# Patient Record
Sex: FEMALE | Race: WHITE | NOT HISPANIC OR LATINO | Employment: OTHER | URBAN - METROPOLITAN AREA
[De-identification: names, ages, dates, MRNs, and addresses within clinical notes are randomized per-mention and may not be internally consistent; named-entity substitution may affect disease eponyms.]

---

## 2017-06-19 ENCOUNTER — RECORDS - HEALTHEAST (OUTPATIENT)
Dept: LAB | Facility: CLINIC | Age: 67
End: 2017-06-19

## 2017-06-19 LAB
CHOLEST SERPL-MCNC: 214 MG/DL
FASTING STATUS PATIENT QL REPORTED: ABNORMAL
HDLC SERPL-MCNC: 49 MG/DL
LDLC SERPL CALC-MCNC: 87 MG/DL
LDLC SERPL CALC-MCNC: ABNORMAL MG/DL
TRIGL SERPL-MCNC: 420 MG/DL

## 2018-07-18 ENCOUNTER — RECORDS - HEALTHEAST (OUTPATIENT)
Dept: LAB | Facility: CLINIC | Age: 68
End: 2018-07-18

## 2018-07-18 LAB
ANION GAP SERPL CALCULATED.3IONS-SCNC: 10 MMOL/L (ref 5–18)
BUN SERPL-MCNC: 18 MG/DL (ref 8–22)
CALCIUM SERPL-MCNC: 9.7 MG/DL (ref 8.5–10.5)
CHLORIDE BLD-SCNC: 106 MMOL/L (ref 98–107)
CHOLEST SERPL-MCNC: 185 MG/DL
CO2 SERPL-SCNC: 25 MMOL/L (ref 22–31)
CREAT SERPL-MCNC: 0.8 MG/DL (ref 0.6–1.1)
FASTING STATUS PATIENT QL REPORTED: ABNORMAL
GFR SERPL CREATININE-BSD FRML MDRD: >60 ML/MIN/1.73M2
GLUCOSE BLD-MCNC: 138 MG/DL (ref 70–125)
HDLC SERPL-MCNC: 54 MG/DL
LDLC SERPL CALC-MCNC: 72 MG/DL
POTASSIUM BLD-SCNC: 5 MMOL/L (ref 3.5–5)
SODIUM SERPL-SCNC: 141 MMOL/L (ref 136–145)
TRIGL SERPL-MCNC: 297 MG/DL

## 2019-03-15 ENCOUNTER — RECORDS - HEALTHEAST (OUTPATIENT)
Dept: LAB | Facility: CLINIC | Age: 69
End: 2019-03-15

## 2019-03-15 LAB
ANION GAP SERPL CALCULATED.3IONS-SCNC: 13 MMOL/L (ref 5–18)
BUN SERPL-MCNC: 16 MG/DL (ref 8–22)
CALCIUM SERPL-MCNC: 9.8 MG/DL (ref 8.5–10.5)
CHLORIDE BLD-SCNC: 105 MMOL/L (ref 98–107)
CO2 SERPL-SCNC: 24 MMOL/L (ref 22–31)
CREAT SERPL-MCNC: 0.93 MG/DL (ref 0.6–1.1)
GFR SERPL CREATININE-BSD FRML MDRD: 60 ML/MIN/1.73M2
GLUCOSE BLD-MCNC: 116 MG/DL (ref 70–125)
POTASSIUM BLD-SCNC: 4.9 MMOL/L (ref 3.5–5)
SODIUM SERPL-SCNC: 142 MMOL/L (ref 136–145)

## 2020-09-30 ENCOUNTER — RECORDS - HEALTHEAST (OUTPATIENT)
Dept: LAB | Facility: CLINIC | Age: 70
End: 2020-09-30

## 2020-09-30 LAB
ANION GAP SERPL CALCULATED.3IONS-SCNC: 11 MMOL/L (ref 5–18)
BUN SERPL-MCNC: 16 MG/DL (ref 8–22)
CALCIUM SERPL-MCNC: 9.4 MG/DL (ref 8.5–10.5)
CHLORIDE BLD-SCNC: 103 MMOL/L (ref 98–107)
CHOLEST SERPL-MCNC: 239 MG/DL
CO2 SERPL-SCNC: 25 MMOL/L (ref 22–31)
CREAT SERPL-MCNC: 0.86 MG/DL (ref 0.6–1.1)
FASTING STATUS PATIENT QL REPORTED: ABNORMAL
GFR SERPL CREATININE-BSD FRML MDRD: >60 ML/MIN/1.73M2
GLUCOSE BLD-MCNC: 121 MG/DL (ref 70–125)
HDLC SERPL-MCNC: 107 MG/DL
LDLC SERPL CALC-MCNC: 115 MG/DL
POTASSIUM BLD-SCNC: 4.5 MMOL/L (ref 3.5–5)
SODIUM SERPL-SCNC: 139 MMOL/L (ref 136–145)
TRIGL SERPL-MCNC: 85 MG/DL

## 2021-05-28 ENCOUNTER — RECORDS - HEALTHEAST (OUTPATIENT)
Dept: ADMINISTRATIVE | Facility: CLINIC | Age: 71
End: 2021-05-28

## 2021-05-28 ENCOUNTER — RECORDS - HEALTHEAST (OUTPATIENT)
Dept: LAB | Facility: CLINIC | Age: 71
End: 2021-05-28

## 2021-05-28 LAB
ANION GAP SERPL CALCULATED.3IONS-SCNC: 12 MMOL/L (ref 5–18)
BUN SERPL-MCNC: 18 MG/DL (ref 8–22)
CALCIUM SERPL-MCNC: 9 MG/DL (ref 8.5–10.5)
CHLORIDE BLD-SCNC: 102 MMOL/L (ref 98–107)
CHOLEST SERPL-MCNC: 181 MG/DL
CO2 SERPL-SCNC: 23 MMOL/L (ref 22–31)
CREAT SERPL-MCNC: 1.22 MG/DL (ref 0.6–1.1)
FASTING STATUS PATIENT QL REPORTED: YES
FERRITIN SERPL-MCNC: 22 NG/ML (ref 10–130)
GFR SERPL CREATININE-BSD FRML MDRD: 44 ML/MIN/1.73M2
GLUCOSE BLD-MCNC: 117 MG/DL (ref 70–125)
HDLC SERPL-MCNC: 61 MG/DL
IRON SATN MFR SERPL: 15 % (ref 20–50)
IRON SERPL-MCNC: 58 UG/DL (ref 42–175)
LDLC SERPL CALC-MCNC: 90 MG/DL
POTASSIUM BLD-SCNC: 5.3 MMOL/L (ref 3.5–5)
SODIUM SERPL-SCNC: 137 MMOL/L (ref 136–145)
TIBC SERPL-MCNC: 378 UG/DL (ref 313–563)
TRANSFERRIN SERPL-MCNC: 302 MG/DL (ref 212–360)
TRIGL SERPL-MCNC: 150 MG/DL
VIT B12 SERPL-MCNC: 361 PG/ML (ref 213–816)

## 2021-05-29 LAB
BASOPHILS # BLD AUTO: 0 THOU/UL (ref 0–0.2)
BASOPHILS NFR BLD AUTO: 1 % (ref 0–2)
EOSINOPHIL # BLD AUTO: 0.3 THOU/UL (ref 0–0.4)
EOSINOPHIL NFR BLD AUTO: 5 % (ref 0–6)
ERYTHROCYTE [DISTWIDTH] IN BLOOD BY AUTOMATED COUNT: 12.9 % (ref 11–14.5)
HCT VFR BLD AUTO: 28.7 % (ref 35–47)
HGB BLD-MCNC: 9.1 G/DL (ref 12–16)
IMM GRANULOCYTES # BLD: 0 THOU/UL
IMM GRANULOCYTES NFR BLD: 0 %
LYMPHOCYTES # BLD AUTO: 1.9 THOU/UL (ref 0.8–4.4)
LYMPHOCYTES NFR BLD AUTO: 32 % (ref 20–40)
MCH RBC QN AUTO: 31.3 PG (ref 27–34)
MCHC RBC AUTO-ENTMCNC: 31.7 G/DL (ref 32–36)
MCV RBC AUTO: 99 FL (ref 80–100)
MONOCYTES # BLD AUTO: 0.4 THOU/UL (ref 0–0.9)
MONOCYTES NFR BLD AUTO: 6 % (ref 2–10)
NEUTROPHILS # BLD AUTO: 3.2 THOU/UL (ref 2–7.7)
NEUTROPHILS NFR BLD AUTO: 55 % (ref 50–70)
PATH REPORT.MICROSCOPIC SPEC OTHER STN: ABNORMAL
PLATELET # BLD AUTO: 264 THOU/UL (ref 140–440)
PMV BLD AUTO: 9.9 FL (ref 8.5–12.5)
RBC # BLD AUTO: 2.91 MILL/UL (ref 3.8–5.4)
WBC: 5.8 THOU/UL (ref 4–11)

## 2021-06-01 LAB
LAB AP CHARGES (HE HISTORICAL CONVERSION): NORMAL
PATH REPORT.COMMENTS IMP SPEC: NORMAL
PATH REPORT.COMMENTS IMP SPEC: NORMAL
PATH REPORT.FINAL DX SPEC: NORMAL
PATH REPORT.MICROSCOPIC SPEC OTHER STN: NORMAL
PATH REPORT.RELEVANT HX SPEC: NORMAL

## 2021-06-08 ENCOUNTER — RECORDS - HEALTHEAST (OUTPATIENT)
Dept: LAB | Facility: CLINIC | Age: 71
End: 2021-06-08

## 2021-06-08 LAB
ANION GAP SERPL CALCULATED.3IONS-SCNC: 14 MMOL/L (ref 5–18)
BUN SERPL-MCNC: 12 MG/DL (ref 8–22)
CALCIUM SERPL-MCNC: 8.8 MG/DL (ref 8.5–10.5)
CHLORIDE BLD-SCNC: 99 MMOL/L (ref 98–107)
CO2 SERPL-SCNC: 22 MMOL/L (ref 22–31)
CREAT SERPL-MCNC: 1.52 MG/DL (ref 0.6–1.1)
FOLATE SERPL-MCNC: 6.8 NG/ML
GFR SERPL CREATININE-BSD FRML MDRD: 34 ML/MIN/1.73M2
GLUCOSE BLD-MCNC: 118 MG/DL (ref 70–125)
POTASSIUM BLD-SCNC: 4.7 MMOL/L (ref 3.5–5)
SODIUM SERPL-SCNC: 135 MMOL/L (ref 136–145)

## 2021-06-10 LAB
ALBUMIN PERCENT: 56.2 % (ref 51–67)
ALBUMIN SERPL ELPH-MCNC: 4.2 G/DL (ref 3.2–4.7)
ALPHA 1 PERCENT: 3 % (ref 2–4)
ALPHA 2 PERCENT: 10.9 % (ref 5–13)
ALPHA1 GLOB SERPL ELPH-MCNC: 0.2 G/DL (ref 0.1–0.3)
ALPHA2 GLOB SERPL ELPH-MCNC: 0.8 G/DL (ref 0.4–0.9)
B-GLOBULIN SERPL ELPH-MCNC: 1.1 G/DL (ref 0.7–1.2)
BETA PERCENT: 14.5 % (ref 10–17)
GAMMA GLOB SERPL ELPH-MCNC: 1.1 G/DL (ref 0.6–1.4)
GAMMA GLOBULIN PERCENT: 15.4 % (ref 9–20)
PATH ICD:: NORMAL
PROT PATTERN SERPL ELPH-IMP: NORMAL
PROT SERPL-MCNC: 7.4 G/DL (ref 6–8)
REVIEWING PATHOLOGIST: NORMAL

## 2021-07-30 ENCOUNTER — LAB REQUISITION (OUTPATIENT)
Dept: LAB | Facility: CLINIC | Age: 71
End: 2021-07-30

## 2021-07-30 DIAGNOSIS — N19 UNSPECIFIED KIDNEY FAILURE: ICD-10-CM

## 2021-07-30 DIAGNOSIS — Z87.39 PERSONAL HISTORY OF OTHER DISEASES OF THE MUSCULOSKELETAL SYSTEM AND CONNECTIVE TISSUE: ICD-10-CM

## 2021-07-30 LAB
ANION GAP SERPL CALCULATED.3IONS-SCNC: 11 MMOL/L (ref 5–18)
BUN SERPL-MCNC: 17 MG/DL (ref 8–22)
CALCIUM SERPL-MCNC: 9.9 MG/DL (ref 8.5–10.5)
CHLORIDE BLD-SCNC: 101 MMOL/L (ref 98–107)
CO2 SERPL-SCNC: 22 MMOL/L (ref 22–31)
CREAT SERPL-MCNC: 1.04 MG/DL (ref 0.6–1.1)
GFR SERPL CREATININE-BSD FRML MDRD: 55 ML/MIN/1.73M2
GLUCOSE BLD-MCNC: 107 MG/DL (ref 70–125)
POTASSIUM BLD-SCNC: 5 MMOL/L (ref 3.5–5)
SODIUM SERPL-SCNC: 134 MMOL/L (ref 136–145)
URATE SERPL-MCNC: 6 MG/DL (ref 2–7.5)

## 2021-07-30 PROCEDURE — 80048 BASIC METABOLIC PNL TOTAL CA: CPT | Performed by: FAMILY MEDICINE

## 2021-07-30 PROCEDURE — 36415 COLL VENOUS BLD VENIPUNCTURE: CPT | Performed by: FAMILY MEDICINE

## 2021-07-30 PROCEDURE — 84550 ASSAY OF BLOOD/URIC ACID: CPT | Performed by: FAMILY MEDICINE

## 2022-01-01 ENCOUNTER — APPOINTMENT (OUTPATIENT)
Dept: ULTRASOUND IMAGING | Facility: HOSPITAL | Age: 72
DRG: 094 | End: 2022-01-01
Attending: FAMILY MEDICINE
Payer: COMMERCIAL

## 2022-01-01 ENCOUNTER — APPOINTMENT (OUTPATIENT)
Dept: MRI IMAGING | Facility: HOSPITAL | Age: 72
DRG: 094 | End: 2022-01-01
Attending: PSYCHIATRY & NEUROLOGY
Payer: COMMERCIAL

## 2022-01-01 ENCOUNTER — APPOINTMENT (OUTPATIENT)
Dept: NUCLEAR MEDICINE | Facility: HOSPITAL | Age: 72
DRG: 094 | End: 2022-01-01
Attending: FAMILY MEDICINE
Payer: COMMERCIAL

## 2022-01-01 ENCOUNTER — APPOINTMENT (OUTPATIENT)
Dept: INTERVENTIONAL RADIOLOGY/VASCULAR | Facility: HOSPITAL | Age: 72
DRG: 094 | End: 2022-01-01
Attending: NURSE PRACTITIONER
Payer: COMMERCIAL

## 2022-01-01 ENCOUNTER — APPOINTMENT (OUTPATIENT)
Dept: PHYSICAL THERAPY | Facility: HOSPITAL | Age: 72
DRG: 094 | End: 2022-01-01
Attending: INTERNAL MEDICINE
Payer: COMMERCIAL

## 2022-01-01 ENCOUNTER — APPOINTMENT (OUTPATIENT)
Dept: RADIOLOGY | Facility: HOSPITAL | Age: 72
DRG: 094 | End: 2022-01-01
Attending: INTERNAL MEDICINE
Payer: COMMERCIAL

## 2022-01-01 ENCOUNTER — HOSPITAL ENCOUNTER (OUTPATIENT)
Facility: CLINIC | Age: 72
End: 2022-01-01
Payer: COMMERCIAL

## 2022-01-01 ENCOUNTER — ANCILLARY PROCEDURE (OUTPATIENT)
Dept: ULTRASOUND IMAGING | Facility: HOSPITAL | Age: 72
DRG: 094 | End: 2022-01-01
Attending: EMERGENCY MEDICINE
Payer: COMMERCIAL

## 2022-01-01 ENCOUNTER — APPOINTMENT (OUTPATIENT)
Dept: RADIOLOGY | Facility: HOSPITAL | Age: 72
DRG: 094 | End: 2022-01-01
Attending: FAMILY MEDICINE
Payer: COMMERCIAL

## 2022-01-01 ENCOUNTER — HOSPITAL ENCOUNTER (INPATIENT)
Facility: HOSPITAL | Age: 72
LOS: 3 days | DRG: 094 | End: 2022-09-08
Attending: EMERGENCY MEDICINE | Admitting: INTERNAL MEDICINE
Payer: COMMERCIAL

## 2022-01-01 ENCOUNTER — APPOINTMENT (OUTPATIENT)
Dept: CARDIOLOGY | Facility: HOSPITAL | Age: 72
DRG: 094 | End: 2022-01-01
Attending: FAMILY MEDICINE
Payer: COMMERCIAL

## 2022-01-01 ENCOUNTER — APPOINTMENT (OUTPATIENT)
Dept: INTERVENTIONAL RADIOLOGY/VASCULAR | Facility: HOSPITAL | Age: 72
DRG: 094 | End: 2022-01-01
Attending: RADIOLOGY
Payer: COMMERCIAL

## 2022-01-01 ENCOUNTER — APPOINTMENT (OUTPATIENT)
Dept: MRI IMAGING | Facility: HOSPITAL | Age: 72
DRG: 094 | End: 2022-01-01
Attending: EMERGENCY MEDICINE
Payer: COMMERCIAL

## 2022-01-01 ENCOUNTER — APPOINTMENT (OUTPATIENT)
Dept: OCCUPATIONAL THERAPY | Facility: HOSPITAL | Age: 72
DRG: 094 | End: 2022-01-01
Attending: INTERNAL MEDICINE
Payer: COMMERCIAL

## 2022-01-01 ENCOUNTER — APPOINTMENT (OUTPATIENT)
Dept: CT IMAGING | Facility: HOSPITAL | Age: 72
DRG: 094 | End: 2022-01-01
Attending: FAMILY MEDICINE
Payer: COMMERCIAL

## 2022-01-01 VITALS
HEIGHT: 65 IN | TEMPERATURE: 100.6 F | OXYGEN SATURATION: 91 % | BODY MASS INDEX: 28.32 KG/M2 | WEIGHT: 170 LBS | SYSTOLIC BLOOD PRESSURE: 115 MMHG | DIASTOLIC BLOOD PRESSURE: 56 MMHG

## 2022-01-01 DIAGNOSIS — M60.9 MYOSITIS OF MULTIPLE SITES, UNSPECIFIED MYOSITIS TYPE: ICD-10-CM

## 2022-01-01 DIAGNOSIS — I46.9 CARDIAC ARREST (H): ICD-10-CM

## 2022-01-01 DIAGNOSIS — N17.9 ACUTE KIDNEY INJURY (H): ICD-10-CM

## 2022-01-01 LAB
ALBUMIN MFR UR ELPH: 26.8 MG/DL
ALBUMIN SERPL-MCNC: 1.9 G/DL (ref 3.5–5)
ALBUMIN SERPL-MCNC: 2.5 G/DL (ref 3.5–5)
ALBUMIN SERPL-MCNC: 3.3 G/DL (ref 3.5–5)
ALBUMIN UR-MCNC: 30 MG/DL
ALDOLASE SERPL-CCNC: 14 U/L
ALP SERPL-CCNC: 205 U/L (ref 45–120)
ALP SERPL-CCNC: 84 U/L (ref 45–120)
ALP SERPL-CCNC: 99 U/L (ref 45–120)
ALT SERPL W P-5'-P-CCNC: 17 U/L (ref 0–45)
ALT SERPL W P-5'-P-CCNC: 20 U/L (ref 0–45)
ALT SERPL W P-5'-P-CCNC: 47 U/L (ref 0–45)
AMMONIA PLAS-SCNC: 43 UMOL/L (ref 11–35)
AMMONIA PLAS-SCNC: 55 UMOL/L (ref 11–35)
AMORPH CRY #/AREA URNS HPF: ABNORMAL /HPF
ANA PAT SER IF-IMP: ABNORMAL
ANA SER QL IF: POSITIVE
ANA TITR SER IF: ABNORMAL {TITER}
ANCA AB PATTERN SER IF-IMP: NORMAL
ANION GAP SERPL CALCULATED.3IONS-SCNC: 13 MMOL/L (ref 5–18)
ANION GAP SERPL CALCULATED.3IONS-SCNC: 14 MMOL/L (ref 5–18)
ANION GAP SERPL CALCULATED.3IONS-SCNC: 15 MMOL/L (ref 5–18)
ANION GAP SERPL CALCULATED.3IONS-SCNC: 16 MMOL/L (ref 5–18)
ANION GAP SERPL CALCULATED.3IONS-SCNC: 17 MMOL/L (ref 5–18)
ANION GAP SERPL CALCULATED.3IONS-SCNC: 25 MMOL/L (ref 5–18)
APPEARANCE CSF: CLEAR
APPEARANCE UR: ABNORMAL
AST SERPL W P-5'-P-CCNC: 105 U/L (ref 0–40)
AST SERPL W P-5'-P-CCNC: 55 U/L (ref 0–40)
AST SERPL W P-5'-P-CCNC: 57 U/L (ref 0–40)
ATRIAL RATE - MUSE: 103 BPM
ATRIAL RATE - MUSE: 139 BPM
BASE EXCESS BLDA CALC-SCNC: -10.8 MMOL/L
BASE EXCESS BLDA CALC-SCNC: -3.4 MMOL/L
BASE EXCESS BLDA CALC-SCNC: -9.7 MMOL/L
BASE EXCESS BLDA CALC-SCNC: 8.6 MMOL/L
BASOPHILS # BLD MANUAL: 0 10E3/UL (ref 0–0.2)
BASOPHILS # BLD MANUAL: 0 10E3/UL (ref 0–0.2)
BASOPHILS NFR BLD MANUAL: 0 %
BASOPHILS NFR BLD MANUAL: 0 %
BILIRUB DIRECT SERPL-MCNC: 1.2 MG/DL
BILIRUB DIRECT SERPL-MCNC: 1.7 MG/DL
BILIRUB SERPL-MCNC: 1.6 MG/DL (ref 0–1)
BILIRUB SERPL-MCNC: 1.9 MG/DL (ref 0–1)
BILIRUB SERPL-MCNC: 2.3 MG/DL (ref 0–1)
BILIRUB UR QL STRIP: NEGATIVE
BNP SERPL-MCNC: 895 PG/ML (ref 0–123)
BUN SERPL-MCNC: 48 MG/DL (ref 8–28)
BUN SERPL-MCNC: 60 MG/DL (ref 8–28)
BUN SERPL-MCNC: 62 MG/DL (ref 8–28)
BUN SERPL-MCNC: 67 MG/DL (ref 8–28)
BUN SERPL-MCNC: 68 MG/DL (ref 8–28)
BUN SERPL-MCNC: 75 MG/DL (ref 8–28)
C REACTIVE PROTEIN LHE: 33.6 MG/DL (ref 0–?)
C REACTIVE PROTEIN LHE: 52.1 MG/DL (ref 0–?)
C-ANCA TITR SER IF: NORMAL {TITER}
C3 SERPL-MCNC: 190 MG/DL (ref 81–157)
C4 SERPL-MCNC: 51 MG/DL (ref 13–39)
CALCIUM SERPL-MCNC: 7.2 MG/DL (ref 8.5–10.5)
CALCIUM SERPL-MCNC: 7.8 MG/DL (ref 8.5–10.5)
CALCIUM SERPL-MCNC: 7.9 MG/DL (ref 8.5–10.5)
CALCIUM SERPL-MCNC: 8 MG/DL (ref 8.5–10.5)
CALCIUM SERPL-MCNC: 8.6 MG/DL (ref 8.5–10.5)
CALCIUM SERPL-MCNC: 8.7 MG/DL (ref 8.5–10.5)
CALCIUM, IONIZED MEASURED: 1.01 MMOL/L (ref 1.11–1.3)
CALCIUM, IONIZED MEASURED: 1.11 MMOL/L (ref 1.11–1.3)
CALCIUM, IONIZED MEASURED: 1.14 MMOL/L (ref 1.11–1.3)
CHLORIDE BLD-SCNC: 101 MMOL/L (ref 98–107)
CHLORIDE BLD-SCNC: 101 MMOL/L (ref 98–107)
CHLORIDE BLD-SCNC: 104 MMOL/L (ref 98–107)
CHLORIDE BLD-SCNC: 104 MMOL/L (ref 98–107)
CHLORIDE BLD-SCNC: 105 MMOL/L (ref 98–107)
CHLORIDE BLD-SCNC: 95 MMOL/L (ref 98–107)
CK SERPL-CCNC: 1280 U/L (ref 30–190)
CK SERPL-CCNC: 241 U/L (ref 30–190)
CK SERPL-CCNC: 811 U/L (ref 30–190)
CK SERPL-CCNC: 868 U/L (ref 30–190)
CO2 SERPL-SCNC: 15 MMOL/L (ref 22–31)
CO2 SERPL-SCNC: 17 MMOL/L (ref 22–31)
CO2 SERPL-SCNC: 18 MMOL/L (ref 22–31)
CO2 SERPL-SCNC: 18 MMOL/L (ref 22–31)
COHGB MFR BLD: 90.3 % (ref 95–96)
COHGB MFR BLD: 95.9 % (ref 95–96)
COHGB MFR BLD: 99.3 % (ref 95–96)
COHGB MFR BLD: >100 % (ref 95–96)
COLOR CSF: COLORLESS
COLOR UR AUTO: YELLOW
CORTIS SERPL-MCNC: 25.9 UG/DL
CREAT SERPL-MCNC: 1.78 MG/DL (ref 0.6–1.1)
CREAT SERPL-MCNC: 2.72 MG/DL (ref 0.6–1.1)
CREAT SERPL-MCNC: 2.75 MG/DL (ref 0.6–1.1)
CREAT SERPL-MCNC: 2.79 MG/DL (ref 0.6–1.1)
CREAT SERPL-MCNC: 2.88 MG/DL (ref 0.6–1.1)
CREAT SERPL-MCNC: 3.67 MG/DL (ref 0.6–1.1)
CREAT UR-MCNC: 95 MG/DL
D DIMER PPP FEU-MCNC: 6.23 UG/ML FEU (ref 0–0.5)
DIASTOLIC BLOOD PRESSURE - MUSE: NORMAL MMHG
DIASTOLIC BLOOD PRESSURE - MUSE: NORMAL MMHG
ENA JO1 AB SER IA-ACNC: <0.5 U/ML
ENA JO1 IGG SER-ACNC: NEGATIVE
ENA SS-A AB SER IA-ACNC: 0.5 U/ML
ENA SS-A AB SER IA-ACNC: NEGATIVE
ENA SS-B IGG SER IA-ACNC: <0.6 U/ML
ENA SS-B IGG SER IA-ACNC: NEGATIVE
EOSINOPHIL # BLD MANUAL: 0 10E3/UL (ref 0–0.7)
EOSINOPHIL # BLD MANUAL: 0.1 10E3/UL (ref 0–0.7)
EOSINOPHIL NFR BLD MANUAL: 0 %
EOSINOPHIL NFR BLD MANUAL: 1 %
ERYTHROCYTE [DISTWIDTH] IN BLOOD BY AUTOMATED COUNT: 13.1 % (ref 10–15)
ERYTHROCYTE [DISTWIDTH] IN BLOOD BY AUTOMATED COUNT: 13.6 % (ref 10–15)
ERYTHROCYTE [DISTWIDTH] IN BLOOD BY AUTOMATED COUNT: 13.7 % (ref 10–15)
ERYTHROCYTE [DISTWIDTH] IN BLOOD BY AUTOMATED COUNT: 13.8 % (ref 10–15)
ERYTHROCYTE [DISTWIDTH] IN BLOOD BY AUTOMATED COUNT: 14.1 % (ref 10–15)
ERYTHROCYTE [DISTWIDTH] IN BLOOD BY AUTOMATED COUNT: 14.3 % (ref 10–15)
ERYTHROCYTE [SEDIMENTATION RATE] IN BLOOD BY WESTERGREN METHOD: 110 MM/HR (ref 0–20)
FLOW: 3 LPM
FLUAV RNA SPEC QL NAA+PROBE: NEGATIVE
FLUBV RNA RESP QL NAA+PROBE: NEGATIVE
FOLATE SERPL-MCNC: >40 NG/ML (ref 4.6–34.8)
GFR SERPL CREATININE-BSD FRML MDRD: 13 ML/MIN/1.73M2
GFR SERPL CREATININE-BSD FRML MDRD: 17 ML/MIN/1.73M2
GFR SERPL CREATININE-BSD FRML MDRD: 17 ML/MIN/1.73M2
GFR SERPL CREATININE-BSD FRML MDRD: 18 ML/MIN/1.73M2
GFR SERPL CREATININE-BSD FRML MDRD: 18 ML/MIN/1.73M2
GFR SERPL CREATININE-BSD FRML MDRD: 30 ML/MIN/1.73M2
GLUCOSE BLD-MCNC: 56 MG/DL (ref 70–125)
GLUCOSE BLD-MCNC: 62 MG/DL (ref 70–125)
GLUCOSE BLD-MCNC: 69 MG/DL (ref 70–125)
GLUCOSE BLD-MCNC: 81 MG/DL (ref 70–125)
GLUCOSE BLD-MCNC: 88 MG/DL (ref 70–125)
GLUCOSE BLD-MCNC: 98 MG/DL (ref 70–125)
GLUCOSE BLDC GLUCOMTR-MCNC: 106 MG/DL (ref 70–99)
GLUCOSE BLDC GLUCOMTR-MCNC: 59 MG/DL (ref 70–99)
GLUCOSE CSF-MCNC: 55 MG/DL (ref 40–75)
GLUCOSE UR STRIP-MCNC: NEGATIVE MG/DL
GRAM STAIN RESULT: NORMAL
HAPTOGLOB SERPL-MCNC: 293 MG/DL (ref 32–197)
HCO3 BLD-SCNC: 17 MMOL/L (ref 23–29)
HCO3 BLD-SCNC: 22 MMOL/L (ref 23–29)
HCO3 BLD-SCNC: 31 MMOL/L (ref 23–29)
HCO3 BLD-SCNC: ABNORMAL MMOL/L
HCT VFR BLD AUTO: 26.7 % (ref 35–47)
HCT VFR BLD AUTO: 29.4 % (ref 35–47)
HCT VFR BLD AUTO: 29.9 % (ref 35–47)
HCT VFR BLD AUTO: 30 % (ref 35–47)
HCT VFR BLD AUTO: 32.3 % (ref 35–47)
HCT VFR BLD AUTO: 33.9 % (ref 35–47)
HGB BLD-MCNC: 10.8 G/DL (ref 11.7–15.7)
HGB BLD-MCNC: 10.8 G/DL (ref 11.7–15.7)
HGB BLD-MCNC: 8.3 G/DL (ref 11.7–15.7)
HGB BLD-MCNC: 9.6 G/DL (ref 11.7–15.7)
HGB BLD-MCNC: 9.7 G/DL (ref 11.7–15.7)
HGB BLD-MCNC: 9.7 G/DL (ref 11.7–15.7)
HGB UR QL STRIP: NEGATIVE
HOLD SPECIMEN: NORMAL
HYALINE CASTS: 3 /LPF
INTERPRETATION ECG - MUSE: NORMAL
INTERPRETATION ECG - MUSE: NORMAL
ION CA PH 7.4: 0.93 MMOL/L (ref 1.11–1.3)
ION CA PH 7.4: 1.02 MMOL/L (ref 1.11–1.3)
ION CA PH 7.4: ABNORMAL
KETONES UR STRIP-MCNC: NEGATIVE MG/DL
LACTATE SERPL-SCNC: 2.1 MMOL/L (ref 0.7–2)
LDH SERPL L TO P-CCNC: 239 U/L (ref 125–220)
LDH SERPL L TO P-CCNC: 434 U/L (ref 125–220)
LEUKOCYTE ESTERASE UR QL STRIP: ABNORMAL
LVEF ECHO: NORMAL
LYMPHOCYTES # BLD MANUAL: 0.4 10E3/UL (ref 0.8–5.3)
LYMPHOCYTES # BLD MANUAL: 0.8 10E3/UL (ref 0.8–5.3)
LYMPHOCYTES NFR BLD MANUAL: 14 %
LYMPHOCYTES NFR BLD MANUAL: 7 %
MAGNESIUM SERPL-MCNC: 1.9 MG/DL (ref 1.8–2.6)
MAGNESIUM SERPL-MCNC: 5 MG/DL (ref 1.8–2.6)
MCH RBC QN AUTO: 33 PG (ref 26.5–33)
MCH RBC QN AUTO: 33.1 PG (ref 26.5–33)
MCH RBC QN AUTO: 33.4 PG (ref 26.5–33)
MCH RBC QN AUTO: 33.7 PG (ref 26.5–33)
MCH RBC QN AUTO: 33.7 PG (ref 26.5–33)
MCH RBC QN AUTO: 34.2 PG (ref 26.5–33)
MCHC RBC AUTO-ENTMCNC: 31.1 G/DL (ref 31.5–36.5)
MCHC RBC AUTO-ENTMCNC: 31.9 G/DL (ref 31.5–36.5)
MCHC RBC AUTO-ENTMCNC: 32 G/DL (ref 31.5–36.5)
MCHC RBC AUTO-ENTMCNC: 32.4 G/DL (ref 31.5–36.5)
MCHC RBC AUTO-ENTMCNC: 33 G/DL (ref 31.5–36.5)
MCHC RBC AUTO-ENTMCNC: 33.4 G/DL (ref 31.5–36.5)
MCV RBC AUTO: 102 FL (ref 78–100)
MCV RBC AUTO: 103 FL (ref 78–100)
MCV RBC AUTO: 105 FL (ref 78–100)
MCV RBC AUTO: 109 FL (ref 78–100)
METAMYELOCYTES # BLD MANUAL: 0.2 10E3/UL
METAMYELOCYTES NFR BLD MANUAL: 4 %
MONOCYTES # BLD MANUAL: 0.2 10E3/UL (ref 0–1.3)
MONOCYTES # BLD MANUAL: 0.3 10E3/UL (ref 0–1.3)
MONOCYTES NFR BLD AUTO: NEGATIVE %
MONOCYTES NFR BLD MANUAL: 4 %
MONOCYTES NFR BLD MANUAL: 5 %
MUCOUS THREADS #/AREA URNS LPF: PRESENT /LPF
MYELOCYTES # BLD MANUAL: 0.1 10E3/UL
MYELOCYTES NFR BLD MANUAL: 1 %
NEUTROPHILS # BLD MANUAL: 4.2 10E3/UL (ref 1.6–8.3)
NEUTROPHILS # BLD MANUAL: 4.5 10E3/UL (ref 1.6–8.3)
NEUTROPHILS NFR BLD MANUAL: 77 %
NEUTROPHILS NFR BLD MANUAL: 87 %
NITRATE UR QL: NEGATIVE
NRBC # BLD AUTO: 0.1 10E3/UL
NRBC # BLD AUTO: 0.1 10E3/UL
NRBC BLD MANUAL-RTO: 2 %
NRBC BLD MANUAL-RTO: 2 %
O2/TOTAL GAS SETTING VFR VENT: 0.4 %
O2/TOTAL GAS SETTING VFR VENT: 1 %
O2/TOTAL GAS SETTING VFR VENT: 1 %
OSMOLALITY UR: 292 MMOL/KG (ref 100–1200)
OXYHGB MFR BLD: 88.2 % (ref 95–96)
OXYHGB MFR BLD: 94.5 % (ref 95–96)
OXYHGB MFR BLD: 98.1 % (ref 95–96)
OXYHGB MFR BLD: >98.5 % (ref 95–96)
P AXIS - MUSE: 24 DEGREES
P AXIS - MUSE: 32 DEGREES
PATH REPORT.COMMENTS IMP SPEC: NORMAL
PATH REPORT.FINAL DX SPEC: NORMAL
PATH REPORT.FINAL DX SPEC: NORMAL
PATH REPORT.MICROSCOPIC SPEC OTHER STN: NORMAL
PATH REPORT.RELEVANT HX SPEC: NORMAL
PATH REPORT.RELEVANT HX SPEC: NORMAL
PCO2 BLD: 36 MM HG (ref 35–45)
PCO2 BLD: 39 MM HG (ref 35–45)
PCO2 BLD: 43 MM HG (ref 35–45)
PCO2 BLD: 64 MM HG (ref 35–45)
PEEP: 5 CM H2O
PH BLD: 7.23 [PH] (ref 7.37–7.44)
PH BLD: 7.24 [PH] (ref 7.37–7.44)
PH BLD: 7.34 [PH] (ref 7.37–7.44)
PH BLD: 7.38 [PH] (ref 7.37–7.44)
PH UR STRIP: 5 [PH] (ref 5–7)
PH: 7.09 (ref 7.35–7.45)
PH: 7.24 (ref 7.35–7.45)
PH: 7.25 (ref 7.35–7.45)
PHOSPHATE SERPL-MCNC: 4.8 MG/DL (ref 2.5–4.5)
PLAT MORPH BLD: ABNORMAL
PLAT MORPH BLD: ABNORMAL
PLATELET # BLD AUTO: 46 10E3/UL (ref 150–450)
PLATELET # BLD AUTO: 53 10E3/UL (ref 150–450)
PLATELET # BLD AUTO: 57 10E3/UL (ref 150–450)
PLATELET # BLD AUTO: 59 10E3/UL (ref 150–450)
PLATELET # BLD AUTO: 71 10E3/UL (ref 150–450)
PLATELET # BLD AUTO: 86 10E3/UL (ref 150–450)
PO2 BLD: 146 MM HG (ref 75–85)
PO2 BLD: 303 MM HG (ref 75–85)
PO2 BLD: 54 MM HG (ref 75–85)
PO2 BLD: 81 MM HG (ref 75–85)
POTASSIUM BLD-SCNC: 3.7 MMOL/L (ref 3.5–5)
POTASSIUM BLD-SCNC: 3.8 MMOL/L (ref 3.5–5)
POTASSIUM BLD-SCNC: 4.1 MMOL/L (ref 3.5–5)
POTASSIUM BLD-SCNC: 4.1 MMOL/L (ref 3.5–5)
POTASSIUM BLD-SCNC: 4.6 MMOL/L (ref 3.5–5)
POTASSIUM BLD-SCNC: 5.2 MMOL/L (ref 3.5–5)
PR INTERVAL - MUSE: 152 MS
PR INTERVAL - MUSE: 154 MS
PROT CSF-MCNC: 37 MG/DL (ref 15–45)
PROT SERPL-MCNC: 3.9 G/DL (ref 6–8)
PROT SERPL-MCNC: 5.1 G/DL (ref 6–8)
PROT SERPL-MCNC: 6.1 G/DL (ref 6–8)
PROT/CREAT 24H UR: 0.28 MG/MG CR
QRS DURATION - MUSE: 102 MS
QRS DURATION - MUSE: 122 MS
QT - MUSE: 270 MS
QT - MUSE: 346 MS
QTC - MUSE: 410 MS
QTC - MUSE: 453 MS
R AXIS - MUSE: 24 DEGREES
R AXIS - MUSE: 6 DEGREES
RATE: 18 RR/MIN
RATE: 24 RR/MIN
RATE: 26 RR/MIN
RBC # BLD AUTO: 2.46 10E6/UL (ref 3.8–5.2)
RBC # BLD AUTO: 2.88 10E6/UL (ref 3.8–5.2)
RBC # BLD AUTO: 2.91 10E6/UL (ref 3.8–5.2)
RBC # BLD AUTO: 2.93 10E6/UL (ref 3.8–5.2)
RBC # BLD AUTO: 3.16 10E6/UL (ref 3.8–5.2)
RBC # BLD AUTO: 3.23 10E6/UL (ref 3.8–5.2)
RBC # CSF MANUAL: 0 /UL (ref 0–2)
RBC MORPH BLD: ABNORMAL
RBC MORPH BLD: ABNORMAL
RBC URINE: 1 /HPF
RETICS # AUTO: 0.03 10E6/UL (ref 0.01–0.11)
RETICS # AUTO: 0.07 10E6/UL (ref 0.01–0.11)
RETICS/RBC NFR AUTO: 1.2 % (ref 0.8–2.7)
RETICS/RBC NFR AUTO: 2.1 % (ref 0.8–2.7)
RSV RNA SPEC NAA+PROBE: NEGATIVE
SARS-COV-2 RNA RESP QL NAA+PROBE: NEGATIVE
SMA IGG SER-ACNC: 6 UNITS
SODIUM SERPL-SCNC: 129 MMOL/L (ref 136–145)
SODIUM SERPL-SCNC: 131 MMOL/L (ref 136–145)
SODIUM SERPL-SCNC: 132 MMOL/L (ref 136–145)
SODIUM SERPL-SCNC: 133 MMOL/L (ref 136–145)
SODIUM SERPL-SCNC: 136 MMOL/L (ref 136–145)
SODIUM SERPL-SCNC: 147 MMOL/L (ref 136–145)
SODIUM UR-SCNC: <20 MMOL/L
SP GR UR STRIP: 1.03 (ref 1–1.03)
SQUAMOUS EPITHELIAL: <1 /HPF
SYSTOLIC BLOOD PRESSURE - MUSE: NORMAL MMHG
SYSTOLIC BLOOD PRESSURE - MUSE: NORMAL MMHG
T AXIS - MUSE: 33 DEGREES
T AXIS - MUSE: 54 DEGREES
TEMPERATURE: 37 DEGREES C
TOXIC GRANULES BLD QL SMEAR: PRESENT
TOXIC GRANULES BLD QL SMEAR: PRESENT
TRANSITIONAL EPI: <1 /HPF
TRIGL SERPL-MCNC: 441 MG/DL
TROPONIN I SERPL-MCNC: 0.04 NG/ML (ref 0–0.29)
TSH SERPL DL<=0.005 MIU/L-ACNC: 1.31 UIU/ML (ref 0.3–5)
TUBE # CSF: 4
URATE SERPL-MCNC: 10 MG/DL (ref 2–7.5)
UROBILINOGEN UR STRIP-MCNC: <2 MG/DL
VENTILATION MODE: ABNORMAL
VENTILATOR TIDAL VOLUME: 4180 ML
VENTILATOR TIDAL VOLUME: 480 ML
VENTILATOR TIDAL VOLUME: 480 ML
VENTRICULAR RATE- MUSE: 103 BPM
VENTRICULAR RATE- MUSE: 139 BPM
VIT B12 SERPL-MCNC: >4000 PG/ML (ref 232–1245)
WBC # BLD AUTO: 4.8 10E3/UL (ref 4–11)
WBC # BLD AUTO: 4.8 10E3/UL (ref 4–11)
WBC # BLD AUTO: 5.2 10E3/UL (ref 4–11)
WBC # BLD AUTO: 5.4 10E3/UL (ref 4–11)
WBC # BLD AUTO: 6.9 10E3/UL (ref 4–11)
WBC # BLD AUTO: 7.2 10E3/UL (ref 4–11)
WBC # CSF MANUAL: 0 /UL (ref 0–5)
WBC URINE: 6 /HPF

## 2022-01-01 PROCEDURE — 250N000013 HC RX MED GY IP 250 OP 250 PS 637: Performed by: FAMILY MEDICINE

## 2022-01-01 PROCEDURE — 76770 US EXAM ABDO BACK WALL COMP: CPT

## 2022-01-01 PROCEDURE — 250N000013 HC RX MED GY IP 250 OP 250 PS 637: Performed by: INTERNAL MEDICINE

## 2022-01-01 PROCEDURE — 85060 BLOOD SMEAR INTERPRETATION: CPT | Performed by: PATHOLOGY

## 2022-01-01 PROCEDURE — 99233 SBSQ HOSP IP/OBS HIGH 50: CPT | Performed by: INTERNAL MEDICINE

## 2022-01-01 PROCEDURE — 78582 LUNG VENTILAT&PERFUS IMAGING: CPT

## 2022-01-01 PROCEDURE — 86235 NUCLEAR ANTIGEN ANTIBODY: CPT | Performed by: FAMILY MEDICINE

## 2022-01-01 PROCEDURE — 82607 VITAMIN B-12: CPT | Performed by: FAMILY MEDICINE

## 2022-01-01 PROCEDURE — 99292 CRITICAL CARE ADDL 30 MIN: CPT | Mod: 25 | Performed by: INTERNAL MEDICINE

## 2022-01-01 PROCEDURE — 94003 VENT MGMT INPAT SUBQ DAY: CPT

## 2022-01-01 PROCEDURE — 93010 ELECTROCARDIOGRAM REPORT: CPT | Performed by: INTERNAL MEDICINE

## 2022-01-01 PROCEDURE — 84156 ASSAY OF PROTEIN URINE: CPT | Performed by: INTERNAL MEDICINE

## 2022-01-01 PROCEDURE — 250N000009 HC RX 250: Performed by: RADIOLOGY

## 2022-01-01 PROCEDURE — 84155 ASSAY OF PROTEIN SERUM: CPT | Performed by: INTERNAL MEDICINE

## 2022-01-01 PROCEDURE — 02HV33Z INSERTION OF INFUSION DEVICE INTO SUPERIOR VENA CAVA, PERCUTANEOUS APPROACH: ICD-10-PCS | Performed by: FAMILY MEDICINE

## 2022-01-01 PROCEDURE — 86618 LYME DISEASE ANTIBODY: CPT | Performed by: PSYCHIATRY & NEUROLOGY

## 2022-01-01 PROCEDURE — 86160 COMPLEMENT ANTIGEN: CPT | Performed by: FAMILY MEDICINE

## 2022-01-01 PROCEDURE — 99233 SBSQ HOSP IP/OBS HIGH 50: CPT | Performed by: FAMILY MEDICINE

## 2022-01-01 PROCEDURE — 250N000009 HC RX 250: Performed by: FAMILY MEDICINE

## 2022-01-01 PROCEDURE — 82533 TOTAL CORTISOL: CPT | Performed by: FAMILY MEDICINE

## 2022-01-01 PROCEDURE — 83735 ASSAY OF MAGNESIUM: CPT | Performed by: INTERNAL MEDICINE

## 2022-01-01 PROCEDURE — 31500 INSERT EMERGENCY AIRWAY: CPT | Performed by: INTERNAL MEDICINE

## 2022-01-01 PROCEDURE — 250N000011 HC RX IP 250 OP 636: Performed by: EMERGENCY MEDICINE

## 2022-01-01 PROCEDURE — 255N000002 HC RX 255 OP 636: Performed by: EMERGENCY MEDICINE

## 2022-01-01 PROCEDURE — 255N000002 HC RX 255 OP 636: Performed by: FAMILY MEDICINE

## 2022-01-01 PROCEDURE — 96365 THER/PROPH/DIAG IV INF INIT: CPT

## 2022-01-01 PROCEDURE — 36415 COLL VENOUS BLD VENIPUNCTURE: CPT | Performed by: INTERNAL MEDICINE

## 2022-01-01 PROCEDURE — 999N000026 HC STATISTIC CARDIOPULM RESUSCITATION

## 2022-01-01 PROCEDURE — 82248 BILIRUBIN DIRECT: CPT | Performed by: FAMILY MEDICINE

## 2022-01-01 PROCEDURE — 250N000011 HC RX IP 250 OP 636: Performed by: RADIOLOGY

## 2022-01-01 PROCEDURE — 99233 SBSQ HOSP IP/OBS HIGH 50: CPT | Performed by: PSYCHIATRY & NEUROLOGY

## 2022-01-01 PROCEDURE — 82310 ASSAY OF CALCIUM: CPT | Performed by: EMERGENCY MEDICINE

## 2022-01-01 PROCEDURE — 80048 BASIC METABOLIC PNL TOTAL CA: CPT | Performed by: INTERNAL MEDICINE

## 2022-01-01 PROCEDURE — 36556 INSERT NON-TUNNEL CV CATH: CPT

## 2022-01-01 PROCEDURE — 999N000065 XR CHEST 2 VIEWS

## 2022-01-01 PROCEDURE — 76705 ECHO EXAM OF ABDOMEN: CPT

## 2022-01-01 PROCEDURE — 97162 PT EVAL MOD COMPLEX 30 MIN: CPT | Mod: GP | Performed by: PHYSICAL THERAPIST

## 2022-01-01 PROCEDURE — 71045 X-RAY EXAM CHEST 1 VIEW: CPT

## 2022-01-01 PROCEDURE — 85027 COMPLETE CBC AUTOMATED: CPT | Performed by: FAMILY MEDICINE

## 2022-01-01 PROCEDURE — 82805 BLOOD GASES W/O2 SATURATION: CPT | Performed by: INTERNAL MEDICINE

## 2022-01-01 PROCEDURE — 84550 ASSAY OF BLOOD/URIC ACID: CPT | Performed by: INTERNAL MEDICINE

## 2022-01-01 PROCEDURE — 83519 RIA NONANTIBODY: CPT | Performed by: FAMILY MEDICINE

## 2022-01-01 PROCEDURE — 82550 ASSAY OF CK (CPK): CPT | Performed by: INTERNAL MEDICINE

## 2022-01-01 PROCEDURE — 87798 DETECT AGENT NOS DNA AMP: CPT | Performed by: PSYCHIATRY & NEUROLOGY

## 2022-01-01 PROCEDURE — 87077 CULTURE AEROBIC IDENTIFY: CPT | Performed by: INTERNAL MEDICINE

## 2022-01-01 PROCEDURE — 99291 CRITICAL CARE FIRST HOUR: CPT | Mod: 25 | Performed by: INTERNAL MEDICINE

## 2022-01-01 PROCEDURE — 36600 WITHDRAWAL OF ARTERIAL BLOOD: CPT

## 2022-01-01 PROCEDURE — 87070 CULTURE OTHR SPECIMN AEROBIC: CPT | Performed by: PSYCHIATRY & NEUROLOGY

## 2022-01-01 PROCEDURE — 272N000035 NM LUNG SCAN VENTILATION AND PERFUSION

## 2022-01-01 PROCEDURE — 36415 COLL VENOUS BLD VENIPUNCTURE: CPT | Performed by: EMERGENCY MEDICINE

## 2022-01-01 PROCEDURE — 85652 RBC SED RATE AUTOMATED: CPT | Performed by: EMERGENCY MEDICINE

## 2022-01-01 PROCEDURE — 83615 LACTATE (LD) (LDH) ENZYME: CPT | Performed by: INTERNAL MEDICINE

## 2022-01-01 PROCEDURE — 82140 ASSAY OF AMMONIA: CPT | Performed by: INTERNAL MEDICINE

## 2022-01-01 PROCEDURE — 84450 TRANSFERASE (AST) (SGOT): CPT | Performed by: INTERNAL MEDICINE

## 2022-01-01 PROCEDURE — 120N000001 HC R&B MED SURG/OB

## 2022-01-01 PROCEDURE — 85379 FIBRIN DEGRADATION QUANT: CPT | Performed by: FAMILY MEDICINE

## 2022-01-01 PROCEDURE — 97166 OT EVAL MOD COMPLEX 45 MIN: CPT | Mod: GO

## 2022-01-01 PROCEDURE — 36569 INSJ PICC 5 YR+ W/O IMAGING: CPT

## 2022-01-01 PROCEDURE — 80053 COMPREHEN METABOLIC PANEL: CPT | Performed by: FAMILY MEDICINE

## 2022-01-01 PROCEDURE — 70551 MRI BRAIN STEM W/O DYE: CPT

## 2022-01-01 PROCEDURE — 99207 PR NO BILLABLE SERVICE THIS VISIT: CPT | Performed by: INTERNAL MEDICINE

## 2022-01-01 PROCEDURE — 89050 BODY FLUID CELL COUNT: CPT | Performed by: PSYCHIATRY & NEUROLOGY

## 2022-01-01 PROCEDURE — 250N000009 HC RX 250: Performed by: INTERNAL MEDICINE

## 2022-01-01 PROCEDURE — 258N000003 HC RX IP 258 OP 636: Performed by: INTERNAL MEDICINE

## 2022-01-01 PROCEDURE — 272N000602 HC WOUND GLUE CR1

## 2022-01-01 PROCEDURE — 82085 ASSAY OF ALDOLASE: CPT | Performed by: FAMILY MEDICINE

## 2022-01-01 PROCEDURE — 94002 VENT MGMT INPAT INIT DAY: CPT

## 2022-01-01 PROCEDURE — HZ2ZZZZ DETOXIFICATION SERVICES FOR SUBSTANCE ABUSE TREATMENT: ICD-10-PCS | Performed by: FAMILY MEDICINE

## 2022-01-01 PROCEDURE — 999N000157 HC STATISTIC RCP TIME EA 10 MIN

## 2022-01-01 PROCEDURE — 82945 GLUCOSE OTHER FLUID: CPT | Performed by: PSYCHIATRY & NEUROLOGY

## 2022-01-01 PROCEDURE — A9567 TECHNETIUM TC-99M AEROSOL: HCPCS | Performed by: FAMILY MEDICINE

## 2022-01-01 PROCEDURE — 250N000011 HC RX IP 250 OP 636: Performed by: INTERNAL MEDICINE

## 2022-01-01 PROCEDURE — 85007 BL SMEAR W/DIFF WBC COUNT: CPT | Performed by: INTERNAL MEDICINE

## 2022-01-01 PROCEDURE — 83605 ASSAY OF LACTIC ACID: CPT | Performed by: INTERNAL MEDICINE

## 2022-01-01 PROCEDURE — 96361 HYDRATE IV INFUSION ADD-ON: CPT

## 2022-01-01 PROCEDURE — 99221 1ST HOSP IP/OBS SF/LOW 40: CPT | Performed by: INTERNAL MEDICINE

## 2022-01-01 PROCEDURE — 85027 COMPLETE CBC AUTOMATED: CPT | Performed by: INTERNAL MEDICINE

## 2022-01-01 PROCEDURE — 93005 ELECTROCARDIOGRAM TRACING: CPT | Performed by: INTERNAL MEDICINE

## 2022-01-01 PROCEDURE — 93306 TTE W/DOPPLER COMPLETE: CPT | Mod: 26 | Performed by: INTERNAL MEDICINE

## 2022-01-01 PROCEDURE — C1752 CATH,HEMODIALYSIS,SHORT-TERM: HCPCS

## 2022-01-01 PROCEDURE — A9585 GADOBUTROL INJECTION: HCPCS | Performed by: EMERGENCY MEDICINE

## 2022-01-01 PROCEDURE — 36620 INSERTION CATHETER ARTERY: CPT | Performed by: INTERNAL MEDICINE

## 2022-01-01 PROCEDURE — 999N000009 HC STATISTIC AIRWAY CARE

## 2022-01-01 PROCEDURE — 85027 COMPLETE CBC AUTOMATED: CPT | Performed by: EMERGENCY MEDICINE

## 2022-01-01 PROCEDURE — C9803 HOPD COVID-19 SPEC COLLECT: HCPCS

## 2022-01-01 PROCEDURE — 250N000011 HC RX IP 250 OP 636: Performed by: FAMILY MEDICINE

## 2022-01-01 PROCEDURE — C1769 GUIDE WIRE: HCPCS

## 2022-01-01 PROCEDURE — 86015 ACTIN ANTIBODY EACH: CPT | Performed by: FAMILY MEDICINE

## 2022-01-01 PROCEDURE — A9540 TC99M MAA: HCPCS | Performed by: FAMILY MEDICINE

## 2022-01-01 PROCEDURE — 93005 ELECTROCARDIOGRAM TRACING: CPT

## 2022-01-01 PROCEDURE — 258N000003 HC RX IP 258 OP 636: Performed by: FAMILY MEDICINE

## 2022-01-01 PROCEDURE — 999N000185 HC STATISTIC TRANSPORT TIME EA 15 MIN

## 2022-01-01 PROCEDURE — 99223 1ST HOSP IP/OBS HIGH 75: CPT | Performed by: PSYCHIATRY & NEUROLOGY

## 2022-01-01 PROCEDURE — 99231 SBSQ HOSP IP/OBS SF/LOW 25: CPT | Performed by: PSYCHIATRY & NEUROLOGY

## 2022-01-01 PROCEDURE — 999N000065 XR CHEST PORT 1 VIEW

## 2022-01-01 PROCEDURE — 36415 COLL VENOUS BLD VENIPUNCTURE: CPT | Performed by: FAMILY MEDICINE

## 2022-01-01 PROCEDURE — P9045 ALBUMIN (HUMAN), 5%, 250 ML: HCPCS | Performed by: INTERNAL MEDICINE

## 2022-01-01 PROCEDURE — 70544 MR ANGIOGRAPHY HEAD W/O DYE: CPT

## 2022-01-01 PROCEDURE — 84300 ASSAY OF URINE SODIUM: CPT | Performed by: FAMILY MEDICINE

## 2022-01-01 PROCEDURE — 87205 SMEAR GRAM STAIN: CPT | Performed by: PSYCHIATRY & NEUROLOGY

## 2022-01-01 PROCEDURE — 250N000009 HC RX 250

## 2022-01-01 PROCEDURE — 93005 ELECTROCARDIOGRAM TRACING: CPT | Performed by: EMERGENCY MEDICINE

## 2022-01-01 PROCEDURE — 99285 EMERGENCY DEPT VISIT HI MDM: CPT | Mod: 25

## 2022-01-01 PROCEDURE — 82746 ASSAY OF FOLIC ACID SERUM: CPT | Performed by: FAMILY MEDICINE

## 2022-01-01 PROCEDURE — 87637 SARSCOV2&INF A&B&RSV AMP PRB: CPT | Performed by: EMERGENCY MEDICINE

## 2022-01-01 PROCEDURE — 999N000208 ECHOCARDIOGRAM COMPLETE

## 2022-01-01 PROCEDURE — 86617 LYME DISEASE ANTIBODY: CPT | Performed by: PSYCHIATRY & NEUROLOGY

## 2022-01-01 PROCEDURE — 3E043XZ INTRODUCTION OF VASOPRESSOR INTO CENTRAL VEIN, PERCUTANEOUS APPROACH: ICD-10-PCS | Performed by: INTERNAL MEDICINE

## 2022-01-01 PROCEDURE — 82550 ASSAY OF CK (CPK): CPT | Performed by: EMERGENCY MEDICINE

## 2022-01-01 PROCEDURE — 70450 CT HEAD/BRAIN W/O DYE: CPT

## 2022-01-01 PROCEDURE — 86140 C-REACTIVE PROTEIN: CPT | Performed by: EMERGENCY MEDICINE

## 2022-01-01 PROCEDURE — 86666 EHRLICHIA ANTIBODY: CPT | Performed by: PSYCHIATRY & NEUROLOGY

## 2022-01-01 PROCEDURE — 83935 ASSAY OF URINE OSMOLALITY: CPT | Performed by: FAMILY MEDICINE

## 2022-01-01 PROCEDURE — 200N000001 HC R&B ICU

## 2022-01-01 PROCEDURE — 272N000452 HC KIT SHRLOCK 5FR POWER PICC TRIPLE LUMEN

## 2022-01-01 PROCEDURE — 250N000011 HC RX IP 250 OP 636

## 2022-01-01 PROCEDURE — 86308 HETEROPHILE ANTIBODY SCREEN: CPT | Performed by: FAMILY MEDICINE

## 2022-01-01 PROCEDURE — 85045 AUTOMATED RETICULOCYTE COUNT: CPT | Performed by: FAMILY MEDICINE

## 2022-01-01 PROCEDURE — 99223 1ST HOSP IP/OBS HIGH 75: CPT | Performed by: INTERNAL MEDICINE

## 2022-01-01 PROCEDURE — 83880 ASSAY OF NATRIURETIC PEPTIDE: CPT | Performed by: FAMILY MEDICINE

## 2022-01-01 PROCEDURE — 86036 ANCA SCREEN EACH ANTIBODY: CPT | Performed by: FAMILY MEDICINE

## 2022-01-01 PROCEDURE — 272N000500 HC NEEDLE CR2

## 2022-01-01 PROCEDURE — 84443 ASSAY THYROID STIM HORMONE: CPT | Performed by: FAMILY MEDICINE

## 2022-01-01 PROCEDURE — 86381 MITOCHONDRIAL ANTIBODY EACH: CPT | Performed by: FAMILY MEDICINE

## 2022-01-01 PROCEDURE — 72156 MRI NECK SPINE W/O & W/DYE: CPT

## 2022-01-01 PROCEDURE — 93306 TTE W/DOPPLER COMPLETE: CPT

## 2022-01-01 PROCEDURE — 999N000055 HC STATISTIC END TITIAL CO2 MONITORING

## 2022-01-01 PROCEDURE — 84157 ASSAY OF PROTEIN OTHER: CPT | Performed by: PSYCHIATRY & NEUROLOGY

## 2022-01-01 PROCEDURE — 86038 ANTINUCLEAR ANTIBODIES: CPT | Performed by: FAMILY MEDICINE

## 2022-01-01 PROCEDURE — 85045 AUTOMATED RETICULOCYTE COUNT: CPT | Performed by: INTERNAL MEDICINE

## 2022-01-01 PROCEDURE — 36415 COLL VENOUS BLD VENIPUNCTURE: CPT | Performed by: PSYCHIATRY & NEUROLOGY

## 2022-01-01 PROCEDURE — 80053 COMPREHEN METABOLIC PANEL: CPT | Performed by: INTERNAL MEDICINE

## 2022-01-01 PROCEDURE — 83010 ASSAY OF HAPTOGLOBIN QUANT: CPT | Performed by: INTERNAL MEDICINE

## 2022-01-01 PROCEDURE — 343N000001 HC RX 343: Performed by: FAMILY MEDICINE

## 2022-01-01 PROCEDURE — 96366 THER/PROPH/DIAG IV INF ADDON: CPT

## 2022-01-01 PROCEDURE — 70547 MR ANGIOGRAPHY NECK W/O DYE: CPT

## 2022-01-01 PROCEDURE — 85007 BL SMEAR W/DIFF WBC COUNT: CPT | Performed by: FAMILY MEDICINE

## 2022-01-01 PROCEDURE — 72157 MRI CHEST SPINE W/O & W/DYE: CPT

## 2022-01-01 PROCEDURE — 5A1935Z RESPIRATORY VENTILATION, LESS THAN 24 CONSECUTIVE HOURS: ICD-10-PCS | Performed by: INTERNAL MEDICINE

## 2022-01-01 PROCEDURE — 86140 C-REACTIVE PROTEIN: CPT | Performed by: PSYCHIATRY & NEUROLOGY

## 2022-01-01 PROCEDURE — 82330 ASSAY OF CALCIUM: CPT | Performed by: INTERNAL MEDICINE

## 2022-01-01 PROCEDURE — 36514 APHERESIS PLASMA: CPT

## 2022-01-01 PROCEDURE — 84100 ASSAY OF PHOSPHORUS: CPT | Performed by: INTERNAL MEDICINE

## 2022-01-01 PROCEDURE — 84478 ASSAY OF TRIGLYCERIDES: CPT | Performed by: INTERNAL MEDICINE

## 2022-01-01 PROCEDURE — 96375 TX/PRO/DX INJ NEW DRUG ADDON: CPT

## 2022-01-01 PROCEDURE — 84484 ASSAY OF TROPONIN QUANT: CPT | Performed by: EMERGENCY MEDICINE

## 2022-01-01 PROCEDURE — 009U3ZX DRAINAGE OF SPINAL CANAL, PERCUTANEOUS APPROACH, DIAGNOSTIC: ICD-10-PCS | Performed by: RADIOLOGY

## 2022-01-01 PROCEDURE — 258N000001 HC RX 258

## 2022-01-01 PROCEDURE — 81003 URINALYSIS AUTO W/O SCOPE: CPT | Performed by: FAMILY MEDICINE

## 2022-01-01 PROCEDURE — 82248 BILIRUBIN DIRECT: CPT | Performed by: INTERNAL MEDICINE

## 2022-01-01 PROCEDURE — P9045 ALBUMIN (HUMAN), 5%, 250 ML: HCPCS

## 2022-01-01 PROCEDURE — 02H633Z INSERTION OF INFUSION DEVICE INTO RIGHT ATRIUM, PERCUTANEOUS APPROACH: ICD-10-PCS | Performed by: RADIOLOGY

## 2022-01-01 PROCEDURE — 62328 DX LMBR SPI PNXR W/FLUOR/CT: CPT

## 2022-01-01 RX ORDER — GADOBUTROL 604.72 MG/ML
7 INJECTION INTRAVENOUS ONCE
Status: COMPLETED | OUTPATIENT
Start: 2022-01-01 | End: 2022-01-01

## 2022-01-01 RX ORDER — PIPERACILLIN SODIUM, TAZOBACTAM SODIUM 3; .375 G/15ML; G/15ML
3.38 INJECTION, POWDER, LYOPHILIZED, FOR SOLUTION INTRAVENOUS EVERY 12 HOURS
Status: DISCONTINUED | OUTPATIENT
Start: 2022-01-01 | End: 2022-01-01 | Stop reason: HOSPADM

## 2022-01-01 RX ORDER — NALOXONE HYDROCHLORIDE 0.4 MG/ML
0.4 INJECTION, SOLUTION INTRAMUSCULAR; INTRAVENOUS; SUBCUTANEOUS
Status: DISCONTINUED | OUTPATIENT
Start: 2022-01-01 | End: 2022-01-01 | Stop reason: HOSPADM

## 2022-01-01 RX ORDER — FOLIC ACID 1 MG/1
1 TABLET ORAL DAILY
Status: DISCONTINUED | OUTPATIENT
Start: 2022-01-01 | End: 2022-01-01

## 2022-01-01 RX ORDER — PHENYLEPHRINE HCL IN 0.9% NACL 50MG/250ML
.5-1.25 PLASTIC BAG, INJECTION (ML) INTRAVENOUS CONTINUOUS
Status: DISCONTINUED | OUTPATIENT
Start: 2022-01-01 | End: 2022-01-01 | Stop reason: CLARIF

## 2022-01-01 RX ORDER — ALLOPURINOL 100 MG/1
100 TABLET ORAL DAILY
COMMUNITY
Start: 2022-01-01

## 2022-01-01 RX ORDER — NICOTINE POLACRILEX 4 MG
15-30 LOZENGE BUCCAL
Status: DISCONTINUED | OUTPATIENT
Start: 2022-01-01 | End: 2022-01-01 | Stop reason: HOSPADM

## 2022-01-01 RX ORDER — LISINOPRIL 20 MG/1
20 TABLET ORAL DAILY
Status: DISCONTINUED | OUTPATIENT
Start: 2022-01-01 | End: 2022-01-01

## 2022-01-01 RX ORDER — SODIUM CHLORIDE 9 MG/ML
INJECTION, SOLUTION INTRAVENOUS CONTINUOUS
Status: DISCONTINUED | OUTPATIENT
Start: 2022-01-01 | End: 2022-01-01

## 2022-01-01 RX ORDER — PIPERACILLIN SODIUM, TAZOBACTAM SODIUM 3; .375 G/15ML; G/15ML
3.38 INJECTION, POWDER, LYOPHILIZED, FOR SOLUTION INTRAVENOUS EVERY 8 HOURS
Status: DISCONTINUED | OUTPATIENT
Start: 2022-01-01 | End: 2022-01-01 | Stop reason: DRUGHIGH

## 2022-01-01 RX ORDER — FLUMAZENIL 0.1 MG/ML
0.2 INJECTION, SOLUTION INTRAVENOUS
Status: DISCONTINUED | OUTPATIENT
Start: 2022-01-01 | End: 2022-01-01 | Stop reason: HOSPADM

## 2022-01-01 RX ORDER — BENZTROPINE MESYLATE 0.5 MG/1
1 TABLET ORAL 3 TIMES DAILY PRN
Status: DISCONTINUED | OUTPATIENT
Start: 2022-01-01 | End: 2022-01-01 | Stop reason: HOSPADM

## 2022-01-01 RX ORDER — CLOPIDOGREL BISULFATE 75 MG/1
75 TABLET ORAL DAILY
Status: DISCONTINUED | OUTPATIENT
Start: 2022-01-01 | End: 2022-01-01

## 2022-01-01 RX ORDER — LISINOPRIL 20 MG/1
20 TABLET ORAL DAILY
COMMUNITY
Start: 2021-07-20

## 2022-01-01 RX ORDER — LORAZEPAM 2 MG/ML
1-2 INJECTION INTRAMUSCULAR EVERY 30 MIN PRN
Status: DISCONTINUED | OUTPATIENT
Start: 2022-01-01 | End: 2022-01-01

## 2022-01-01 RX ORDER — PHENYLEPHRINE HCL IN 0.9% NACL 50MG/250ML
.1-6 PLASTIC BAG, INJECTION (ML) INTRAVENOUS CONTINUOUS
Status: DISCONTINUED | OUTPATIENT
Start: 2022-01-01 | End: 2022-01-01 | Stop reason: HOSPADM

## 2022-01-01 RX ORDER — LORAZEPAM 2 MG/ML
1 INJECTION INTRAMUSCULAR ONCE
Status: COMPLETED | OUTPATIENT
Start: 2022-01-01 | End: 2022-01-01

## 2022-01-01 RX ORDER — HEPARIN SODIUM 1000 [USP'U]/ML
2000-5000 INJECTION, SOLUTION INTRAVENOUS; SUBCUTANEOUS CONTINUOUS PRN
Status: DISCONTINUED | OUTPATIENT
Start: 2022-01-01 | End: 2022-01-01 | Stop reason: HOSPADM

## 2022-01-01 RX ORDER — AMOXICILLIN 250 MG
2 CAPSULE ORAL 2 TIMES DAILY
Status: DISCONTINUED | OUTPATIENT
Start: 2022-01-01 | End: 2022-01-01

## 2022-01-01 RX ORDER — ONDANSETRON 2 MG/ML
4 INJECTION INTRAMUSCULAR; INTRAVENOUS EVERY 6 HOURS PRN
Status: DISCONTINUED | OUTPATIENT
Start: 2022-01-01 | End: 2022-01-01

## 2022-01-01 RX ORDER — DEXTROSE MONOHYDRATE 25 G/50ML
INJECTION, SOLUTION INTRAVENOUS
Status: COMPLETED
Start: 2022-01-01 | End: 2022-01-01

## 2022-01-01 RX ORDER — VANCOMYCIN HYDROCHLORIDE 1 G/200ML
1000 INJECTION, SOLUTION INTRAVENOUS EVERY 24 HOURS
Status: DISCONTINUED | OUTPATIENT
Start: 2022-01-01 | End: 2022-01-01 | Stop reason: DRUGHIGH

## 2022-01-01 RX ORDER — LIDOCAINE 40 MG/G
CREAM TOPICAL
Status: DISCONTINUED | OUTPATIENT
Start: 2022-01-01 | End: 2022-01-01 | Stop reason: HOSPADM

## 2022-01-01 RX ORDER — MAGNESIUM SULFATE HEPTAHYDRATE 40 MG/ML
2 INJECTION, SOLUTION INTRAVENOUS ONCE
Status: COMPLETED | OUTPATIENT
Start: 2022-01-01 | End: 2022-01-01

## 2022-01-01 RX ORDER — FUROSEMIDE 10 MG/ML
80 INJECTION INTRAMUSCULAR; INTRAVENOUS EVERY 8 HOURS PRN
Status: DISCONTINUED | OUTPATIENT
Start: 2022-01-01 | End: 2022-01-01 | Stop reason: HOSPADM

## 2022-01-01 RX ORDER — NALOXONE HYDROCHLORIDE 0.4 MG/ML
0.2 INJECTION, SOLUTION INTRAMUSCULAR; INTRAVENOUS; SUBCUTANEOUS
Status: DISCONTINUED | OUTPATIENT
Start: 2022-01-01 | End: 2022-01-01 | Stop reason: HOSPADM

## 2022-01-01 RX ORDER — ANTICOAGULANT CITRATE DEXTROSE SOLUTION FORMULA A 12.25; 11; 3.65 G/500ML; G/500ML; G/500ML
1000 SOLUTION INTRAVENOUS ONCE
Status: COMPLETED | OUTPATIENT
Start: 2022-01-01 | End: 2022-01-01

## 2022-01-01 RX ORDER — MULTIPLE VITAMINS W/ MINERALS TAB 9MG-400MCG
1 TAB ORAL DAILY
Status: DISCONTINUED | OUTPATIENT
Start: 2022-01-01 | End: 2022-01-01

## 2022-01-01 RX ORDER — ACETAMINOPHEN 325 MG/1
975 TABLET ORAL EVERY 6 HOURS PRN
Status: DISCONTINUED | OUTPATIENT
Start: 2022-01-01 | End: 2022-01-01 | Stop reason: CLARIF

## 2022-01-01 RX ORDER — PIPERACILLIN SODIUM, TAZOBACTAM SODIUM 3; .375 G/15ML; G/15ML
3.38 INJECTION, POWDER, LYOPHILIZED, FOR SOLUTION INTRAVENOUS ONCE
Status: COMPLETED | OUTPATIENT
Start: 2022-01-01 | End: 2022-01-01

## 2022-01-01 RX ORDER — ANTICOAGULANT CITRATE DEXTROSE SOLUTION FORMULA A 12.25; 11; 3.65 G/500ML; G/500ML; G/500ML
1000 SOLUTION INTRAVENOUS ONCE
Status: DISCONTINUED | OUTPATIENT
Start: 2022-09-09 | End: 2022-01-01 | Stop reason: HOSPADM

## 2022-01-01 RX ORDER — HEPARIN SODIUM 1000 [USP'U]/ML
2.5 INJECTION, SOLUTION INTRAVENOUS; SUBCUTANEOUS ONCE
Status: COMPLETED | OUTPATIENT
Start: 2022-01-01 | End: 2022-01-01

## 2022-01-01 RX ORDER — HALOPERIDOL 5 MG/ML
2.5-5 INJECTION INTRAMUSCULAR EVERY 6 HOURS PRN
Status: DISCONTINUED | OUTPATIENT
Start: 2022-01-01 | End: 2022-01-01

## 2022-01-01 RX ORDER — POLYETHYLENE GLYCOL 3350 17 G/17G
17 POWDER, FOR SOLUTION ORAL DAILY PRN
Status: DISCONTINUED | OUTPATIENT
Start: 2022-01-01 | End: 2022-01-01 | Stop reason: HOSPADM

## 2022-01-01 RX ORDER — ONDANSETRON 4 MG/1
4 TABLET, ORALLY DISINTEGRATING ORAL EVERY 6 HOURS PRN
Status: DISCONTINUED | OUTPATIENT
Start: 2022-01-01 | End: 2022-01-01

## 2022-01-01 RX ORDER — ALBUTEROL SULFATE 90 UG/1
2 AEROSOL, METERED RESPIRATORY (INHALATION) EVERY 6 HOURS PRN
Status: DISCONTINUED | OUTPATIENT
Start: 2022-01-01 | End: 2022-01-01 | Stop reason: HOSPADM

## 2022-01-01 RX ORDER — BUMETANIDE 0.25 MG/ML
2 INJECTION INTRAMUSCULAR; INTRAVENOUS ONCE
Status: COMPLETED | OUTPATIENT
Start: 2022-01-01 | End: 2022-01-01

## 2022-01-01 RX ORDER — HEPARIN SODIUM 5000 [USP'U]/.5ML
5000 INJECTION, SOLUTION INTRAVENOUS; SUBCUTANEOUS EVERY 8 HOURS
Status: DISCONTINUED | OUTPATIENT
Start: 2022-01-01 | End: 2022-01-01

## 2022-01-01 RX ORDER — CLOPIDOGREL BISULFATE 75 MG/1
75 TABLET ORAL DAILY
COMMUNITY
Start: 2021-07-20

## 2022-01-01 RX ORDER — ALLOPURINOL 100 MG/1
100 TABLET ORAL DAILY
Status: DISCONTINUED | OUTPATIENT
Start: 2022-01-01 | End: 2022-01-01 | Stop reason: HOSPADM

## 2022-01-01 RX ORDER — CHLORHEXIDINE GLUCONATE ORAL RINSE 1.2 MG/ML
15 SOLUTION DENTAL EVERY 12 HOURS
Status: DISCONTINUED | OUTPATIENT
Start: 2022-01-01 | End: 2022-01-01 | Stop reason: HOSPADM

## 2022-01-01 RX ORDER — CEFAZOLIN SODIUM 1 G/50ML
25 SOLUTION INTRAVENOUS ONCE
Status: COMPLETED | OUTPATIENT
Start: 2022-01-01 | End: 2022-01-01

## 2022-01-01 RX ORDER — PREDNISONE 20 MG/1
20 TABLET ORAL DAILY
Status: DISCONTINUED | OUTPATIENT
Start: 2022-01-01 | End: 2022-01-01

## 2022-01-01 RX ORDER — LORAZEPAM 1 MG/1
1-2 TABLET ORAL EVERY 30 MIN PRN
Status: DISCONTINUED | OUTPATIENT
Start: 2022-01-01 | End: 2022-01-01

## 2022-01-01 RX ORDER — HYDRALAZINE HYDROCHLORIDE 25 MG/1
25 TABLET, FILM COATED ORAL EVERY 6 HOURS PRN
Status: DISCONTINUED | OUTPATIENT
Start: 2022-01-01 | End: 2022-01-01 | Stop reason: HOSPADM

## 2022-01-01 RX ORDER — CALCIUM GLUCONATE 20 MG/ML
2 INJECTION, SOLUTION INTRAVENOUS ONCE
Status: COMPLETED | OUTPATIENT
Start: 2022-01-01 | End: 2022-01-01

## 2022-01-01 RX ORDER — SODIUM CHLORIDE, SODIUM LACTATE, POTASSIUM CHLORIDE, CALCIUM CHLORIDE 600; 310; 30; 20 MG/100ML; MG/100ML; MG/100ML; MG/100ML
INJECTION, SOLUTION INTRAVENOUS CONTINUOUS
Status: DISCONTINUED | OUTPATIENT
Start: 2022-01-01 | End: 2022-01-01

## 2022-01-01 RX ORDER — DEXTROSE MONOHYDRATE 25 G/50ML
25-50 INJECTION, SOLUTION INTRAVENOUS
Status: DISCONTINUED | OUTPATIENT
Start: 2022-01-01 | End: 2022-01-01 | Stop reason: HOSPADM

## 2022-01-01 RX ORDER — NOREPINEPHRINE BITARTRATE 0.02 MG/ML
.01-.6 INJECTION, SOLUTION INTRAVENOUS CONTINUOUS
Status: DISCONTINUED | OUTPATIENT
Start: 2022-01-01 | End: 2022-01-01

## 2022-01-01 RX ORDER — ALBUTEROL SULFATE 90 UG/1
2 AEROSOL, METERED RESPIRATORY (INHALATION) EVERY 4 HOURS PRN
COMMUNITY
Start: 2022-01-01

## 2022-01-01 RX ORDER — OLANZAPINE 5 MG/1
5-10 TABLET, ORALLY DISINTEGRATING ORAL EVERY 6 HOURS PRN
Status: DISCONTINUED | OUTPATIENT
Start: 2022-01-01 | End: 2022-01-01

## 2022-01-01 RX ORDER — AMOXICILLIN 250 MG
1 CAPSULE ORAL 2 TIMES DAILY
Status: DISCONTINUED | OUTPATIENT
Start: 2022-01-01 | End: 2022-01-01

## 2022-01-01 RX ORDER — PROPOFOL 10 MG/ML
5-75 INJECTION, EMULSION INTRAVENOUS CONTINUOUS
Status: DISCONTINUED | OUTPATIENT
Start: 2022-01-01 | End: 2022-01-01 | Stop reason: HOSPADM

## 2022-01-01 RX ORDER — MORPHINE SULFATE 4 MG/ML
4 INJECTION, SOLUTION INTRAMUSCULAR; INTRAVENOUS ONCE
Status: COMPLETED | OUTPATIENT
Start: 2022-01-01 | End: 2022-01-01

## 2022-01-01 RX ORDER — NOREPINEPHRINE BITARTRATE 0.02 MG/ML
INJECTION, SOLUTION INTRAVENOUS
Status: COMPLETED
Start: 2022-01-01 | End: 2022-01-01

## 2022-01-01 RX ORDER — SIMVASTATIN 20 MG
20 TABLET ORAL DAILY
COMMUNITY
Start: 2022-01-01

## 2022-01-01 RX ADMIN — PROPOFOL 20 MCG/KG/MIN: 10 INJECTION, EMULSION INTRAVENOUS at 23:33

## 2022-01-01 RX ADMIN — Medication 10 MG: at 00:55

## 2022-01-01 RX ADMIN — DOCUSATE SODIUM 50 MG AND SENNOSIDES 8.6 MG 1 TABLET: 8.6; 5 TABLET, FILM COATED ORAL at 20:13

## 2022-01-01 RX ADMIN — ACETAMINOPHEN 975 MG: 325 TABLET ORAL at 00:09

## 2022-01-01 RX ADMIN — Medication 0.02 MCG/KG/MIN: at 23:03

## 2022-01-01 RX ADMIN — LORAZEPAM 1 MG: 2 INJECTION INTRAMUSCULAR; INTRAVENOUS at 18:42

## 2022-01-01 RX ADMIN — ACETAMINOPHEN 650 MG: 650 SOLUTION ORAL at 03:58

## 2022-01-01 RX ADMIN — HEPARIN SODIUM 2500 UNITS: 1000 INJECTION INTRAVENOUS; SUBCUTANEOUS at 13:10

## 2022-01-01 RX ADMIN — FLUTICASONE FUROATE AND VILANTEROL TRIFENATATE 1 PUFF: 100; 25 POWDER RESPIRATORY (INHALATION) at 11:57

## 2022-01-01 RX ADMIN — LIDOCAINE HYDROCHLORIDE 10 ML: 10 INJECTION, SOLUTION EPIDURAL; INFILTRATION; INTRACAUDAL; PERINEURAL at 13:02

## 2022-01-01 RX ADMIN — SODIUM CHLORIDE 1250 ML: 9 INJECTION, SOLUTION INTRAVENOUS at 17:02

## 2022-01-01 RX ADMIN — MAGNESIUM SULFATE IN WATER 2 G: 40 INJECTION, SOLUTION INTRAVENOUS at 03:23

## 2022-01-01 RX ADMIN — HEPARIN SODIUM 5000 UNITS: 10000 INJECTION, SOLUTION INTRAVENOUS; SUBCUTANEOUS at 21:46

## 2022-01-01 RX ADMIN — PIPERACILLIN AND TAZOBACTAM 3.38 G: 3; .375 INJECTION, POWDER, LYOPHILIZED, FOR SOLUTION INTRAVENOUS at 03:30

## 2022-01-01 RX ADMIN — Medication 0.45 MCG/KG/MIN: at 04:39

## 2022-01-01 RX ADMIN — DOCUSATE SODIUM 50 MG AND SENNOSIDES 8.6 MG 1 TABLET: 8.6; 5 TABLET, FILM COATED ORAL at 08:51

## 2022-01-01 RX ADMIN — ANTICOAGULANT CITRATE DEXTROSE SOLUTION FORMULA A 1000 ML: 12.25; 11; 3.65 SOLUTION INTRAVENOUS at 17:03

## 2022-01-01 RX ADMIN — THIAMINE HCL TAB 100 MG 100 MG: 100 TAB at 20:13

## 2022-01-01 RX ADMIN — FOLIC ACID 1 MG: 1 TABLET ORAL at 20:13

## 2022-01-01 RX ADMIN — ALLOPURINOL 100 MG: 100 TABLET ORAL at 08:48

## 2022-01-01 RX ADMIN — CALCIUM GLUCONATE 5 G: 98 INJECTION, SOLUTION INTRAVENOUS at 16:58

## 2022-01-01 RX ADMIN — CLOPIDOGREL BISULFATE 75 MG: 75 TABLET ORAL at 08:48

## 2022-01-01 RX ADMIN — SODIUM BICARBONATE: 84 INJECTION, SOLUTION INTRAVENOUS at 01:53

## 2022-01-01 RX ADMIN — HYDROMORPHONE HYDROCHLORIDE 1 MG: 2 TABLET ORAL at 07:45

## 2022-01-01 RX ADMIN — KIT FOR THE PREPARATION OF TECHNETIUM TC 99M PENTETATE 53.2 MILLICURIE: 20 INJECTION, POWDER, LYOPHILIZED, FOR SOLUTION INTRAVENOUS; RESPIRATORY (INHALATION) at 09:24

## 2022-01-01 RX ADMIN — ACETAMINOPHEN 975 MG: 325 TABLET ORAL at 00:03

## 2022-01-01 RX ADMIN — SODIUM CHLORIDE: 9 INJECTION, SOLUTION INTRAVENOUS at 07:52

## 2022-01-01 RX ADMIN — Medication 0.38 MCG/KG/MIN: at 02:43

## 2022-01-01 RX ADMIN — ALBUMIN (HUMAN) 4250 ML: 12.5 INJECTION, SOLUTION INTRAVENOUS at 17:03

## 2022-01-01 RX ADMIN — FOLIC ACID: 5 INJECTION, SOLUTION INTRAMUSCULAR; INTRAVENOUS; SUBCUTANEOUS at 15:22

## 2022-01-01 RX ADMIN — PERFLUTREN 3 ML: 6.52 INJECTION, SUSPENSION INTRAVENOUS at 14:00

## 2022-01-01 RX ADMIN — PROPOFOL 30 MCG/KG/MIN: 10 INJECTION, EMULSION INTRAVENOUS at 03:56

## 2022-01-01 RX ADMIN — DEXTROSE MONOHYDRATE 50 ML: 25 INJECTION, SOLUTION INTRAVENOUS at 02:33

## 2022-01-01 RX ADMIN — ACETAMINOPHEN 975 MG: 325 TABLET ORAL at 10:08

## 2022-01-01 RX ADMIN — Medication 0.5 MCG/KG/MIN: at 04:01

## 2022-01-01 RX ADMIN — FLUTICASONE FUROATE AND VILANTEROL TRIFENATATE 1 PUFF: 100; 25 POWDER RESPIRATORY (INHALATION) at 08:50

## 2022-01-01 RX ADMIN — ACETAMINOPHEN 975 MG: 325 TABLET ORAL at 17:15

## 2022-01-01 RX ADMIN — DOCUSATE SODIUM 50 MG AND SENNOSIDES 8.6 MG 1 TABLET: 8.6; 5 TABLET, FILM COATED ORAL at 22:39

## 2022-01-01 RX ADMIN — BUMETANIDE 2 MG: 0.25 INJECTION INTRAMUSCULAR; INTRAVENOUS at 11:52

## 2022-01-01 RX ADMIN — LIDOCAINE HYDROCHLORIDE 2.5 ML: 10 INJECTION, SOLUTION EPIDURAL; INFILTRATION; INTRACAUDAL; PERINEURAL at 23:15

## 2022-01-01 RX ADMIN — Medication 50 MEQ: at 03:33

## 2022-01-01 RX ADMIN — SODIUM BICARBONATE 50 MEQ: 84 INJECTION, SOLUTION INTRAVENOUS at 03:33

## 2022-01-01 RX ADMIN — HYDROMORPHONE HYDROCHLORIDE 1 MG: 2 TABLET ORAL at 02:41

## 2022-01-01 RX ADMIN — CALCIUM GLUCONATE 2 G: 20 INJECTION, SOLUTION INTRAVENOUS at 04:18

## 2022-01-01 RX ADMIN — MORPHINE SULFATE 4 MG: 4 INJECTION, SOLUTION INTRAMUSCULAR; INTRAVENOUS at 20:57

## 2022-01-01 RX ADMIN — AMIODARONE HYDROCHLORIDE 150 MG: 1.5 INJECTION, SOLUTION INTRAVENOUS at 05:46

## 2022-01-01 RX ADMIN — VASOPRESSIN 2.4 UNITS/HR: 20 INJECTION INTRAVENOUS at 01:52

## 2022-01-01 RX ADMIN — Medication 10 MG: at 23:38

## 2022-01-01 RX ADMIN — KIT FOR THE PREPARATION OF TECHNETIUM TC 99M ALBUMIN AGGREGATED 8.2 MILLICURIE: 2.5 INJECTION, POWDER, FOR SOLUTION INTRAVENOUS at 09:29

## 2022-01-01 RX ADMIN — Medication 1 TABLET: at 20:13

## 2022-01-01 RX ADMIN — Medication 25 MCG/HR: at 02:06

## 2022-01-01 RX ADMIN — SODIUM CHLORIDE, POTASSIUM CHLORIDE, SODIUM LACTATE AND CALCIUM CHLORIDE: 600; 310; 30; 20 INJECTION, SOLUTION INTRAVENOUS at 22:45

## 2022-01-01 RX ADMIN — HEPARIN SODIUM 2500 UNITS: 1000 INJECTION INTRAVENOUS; SUBCUTANEOUS at 19:05

## 2022-01-01 RX ADMIN — SODIUM BICARBONATE: 84 INJECTION, SOLUTION INTRAVENOUS at 14:24

## 2022-01-01 RX ADMIN — SODIUM CHLORIDE 500 ML: 9 INJECTION, SOLUTION INTRAVENOUS at 04:03

## 2022-01-01 RX ADMIN — LORAZEPAM 1 MG: 1 TABLET ORAL at 06:33

## 2022-01-01 RX ADMIN — GADOBUTROL 7 ML: 604.72 INJECTION INTRAVENOUS at 19:29

## 2022-01-01 RX ADMIN — ALBUMIN (HUMAN) 500 ML: 12.5 SOLUTION INTRAVENOUS at 03:35

## 2022-01-01 RX ADMIN — HEPARIN SODIUM 5000 UNITS: 10000 INJECTION, SOLUTION INTRAVENOUS; SUBCUTANEOUS at 05:21

## 2022-01-01 RX ADMIN — HYDROMORPHONE HYDROCHLORIDE 1 MG: 2 TABLET ORAL at 22:41

## 2022-01-01 RX ADMIN — HEPARIN SODIUM 5000 UNITS: 10000 INJECTION, SOLUTION INTRAVENOUS; SUBCUTANEOUS at 14:32

## 2022-01-01 RX ADMIN — LIDOCAINE HYDROCHLORIDE 10 ML: 10 INJECTION, SOLUTION INFILTRATION; PERINEURAL at 13:42

## 2022-01-01 RX ADMIN — VANCOMYCIN HYDROCHLORIDE 2000 MG: 10 INJECTION, POWDER, LYOPHILIZED, FOR SOLUTION INTRAVENOUS at 04:51

## 2022-01-01 RX ADMIN — CHLORHEXIDINE GLUCONATE 15 ML: 1.2 SOLUTION ORAL at 23:39

## 2022-01-01 ASSESSMENT — ACTIVITIES OF DAILY LIVING (ADL)
ADLS_ACUITY_SCORE: 35
ADLS_ACUITY_SCORE: 31
ADLS_ACUITY_SCORE: 25
ADLS_ACUITY_SCORE: 35
ADLS_ACUITY_SCORE: 35
ADLS_ACUITY_SCORE: 25
WALKING_OR_CLIMBING_STAIRS_DIFFICULTY: NO
ADLS_ACUITY_SCORE: 35
ADLS_ACUITY_SCORE: 25
FALL_HISTORY_WITHIN_LAST_SIX_MONTHS: NO
DOING_ERRANDS_INDEPENDENTLY_DIFFICULTY: NO
ADLS_ACUITY_SCORE: 23
ADLS_ACUITY_SCORE: 25
ADLS_ACUITY_SCORE: 31
ADLS_ACUITY_SCORE: 31
ADLS_ACUITY_SCORE: 27
TOILETING_ISSUES: NO
DEPENDENT_IADLS:: TRANSPORTATION
ADLS_ACUITY_SCORE: 35
ADLS_ACUITY_SCORE: 35
ADLS_ACUITY_SCORE: 31
ADLS_ACUITY_SCORE: 31
ADLS_ACUITY_SCORE: 35
VISION_MANAGEMENT: WEARS GLASSES
ADLS_ACUITY_SCORE: 35
ADLS_ACUITY_SCORE: 25
ADLS_ACUITY_SCORE: 31
ADLS_ACUITY_SCORE: 25
DRESSING/BATHING_DIFFICULTY: NO
ADLS_ACUITY_SCORE: 31
WEAR_GLASSES_OR_BLIND: YES
ADLS_ACUITY_SCORE: 25
ADLS_ACUITY_SCORE: 35
ADLS_ACUITY_SCORE: 25
CONCENTRATING,_REMEMBERING_OR_MAKING_DECISIONS_DIFFICULTY: YES
ADLS_ACUITY_SCORE: 35
ADLS_ACUITY_SCORE: 35
CHANGE_IN_FUNCTIONAL_STATUS_SINCE_ONSET_OF_CURRENT_ILLNESS/INJURY: YES
ADLS_ACUITY_SCORE: 25
ADLS_ACUITY_SCORE: 31
ADLS_ACUITY_SCORE: 31
DIFFICULTY_EATING/SWALLOWING: NO
ADLS_ACUITY_SCORE: 31
ADLS_ACUITY_SCORE: 35

## 2022-09-05 PROBLEM — M60.9 MYOSITIS OF MULTIPLE SITES, UNSPECIFIED MYOSITIS TYPE: Status: ACTIVE | Noted: 2022-01-01

## 2022-09-05 PROBLEM — M60.9 MYOSITIS: Status: ACTIVE | Noted: 2022-01-01

## 2022-09-05 PROBLEM — K44.9 DIAPHRAGMATIC HERNIA: Status: ACTIVE | Noted: 2021-07-20

## 2022-09-05 PROBLEM — D50.9 IRON DEFICIENCY ANEMIA: Status: ACTIVE | Noted: 2021-07-22

## 2022-09-05 PROBLEM — K64.9 HEMORRHOIDS: Status: ACTIVE | Noted: 2021-07-20

## 2022-09-05 NOTE — ED NOTES
"ED Provider In Triage Note  [facility]  Encounter Date: Sep 5, 2022    Chief Complaint   Patient presents with     Neck Pain     Arm Pain     Leg Pain       Brief HPI:   Sarina Alfaro is a 72 year old female presenting to the Emergency Department with a chief complaint of atraumatic sharp, aching neck pain that started on Friday. Yesterday the pain started to radiate down both arms and legs. Reports generalized weakness with no focal weakness; no paresthesias. Pain is worse with movement.     Brief Physical Exam:  BP (!) 131/99   Pulse 99   Temp 97.6  F (36.4  C) (Temporal)   Resp 22   Ht 1.651 m (5' 5\")   Wt 77.1 kg (170 lb)   SpO2 96%   BMI 28.29 kg/m    General: Non-toxic appearing  HEENT: Atraumatic  Resp: No respiratory distress  Abdomen: Non-peritoneal  Neuro: Alert, oriented, answers questions appropriately; neuro exam is normal  Psych: Behavior appropriate      Plan Initiated in Triage:  Orders Placed This Encounter     Cervical spine MRI w/o contrast     CBC (+ platelets, no diff)     Basic metabolic panel     CRP inflammation     Erythrocyte sedimentation rate auto     Troponin I (now)     EKG    Consider epidural abscess / hematoma, osteomyelitis, discitis; ACS    PIT Dispo:   Place patient in the next available ED bed    Soila Ayon MD on 9/5/2022 at 5:08 PM    Patient was evaluated by the Physician in Triage due to a limitation of available rooms in the Emergency Department. A plan of care was discussed based on the information obtained on the initial evaluation and patient was consuled to return back to the Emergency Department lobby after this initial evalutaiton until results were obtained or a room became available in the Emergency Department. Patient was counseled not to leave prior to receiving the results of their workup.     Soila Ayon MD  [facility] EMERGENCY DEPARTMENT  [facility] [facility] " [id number]  [phone]       Soila Ayon MD  09/05/22 1718       Soila Ayon MD  09/05/22 171

## 2022-09-05 NOTE — ED PROVIDER NOTES
EMERGENCY DEPARTMENT NOTE     Name: Sarina Alfaro    Age/Sex: 72 year old female   MRN: [id number]   Evaluation Date & Time:  9/5/2022  5:14 PM    PCP:    John Whitehead   ED Provider: Chet Willams D.O.       CHIEF COMPLAINT    Neck Pain, Arm Pain, and Leg Pain       DIAGNOSIS & DISPOSITION     1. Myositis of multiple sites, unspecified myositis type    2. Acute kidney injury (H)      DISPOSITION: Admit to [facility]    At the conclusion of the encounter I discussed the results of all of the tests and the disposition. The questions were answered. The patient or family acknowledged understanding and was agreeable with the care plan.    TOTAL CRITICAL CARE TIME (EXCLUDING PROCEDURES): Not applicable    PROCEDURES:   None    EMERGENCY DEPARTMENT COURSE/MEDICAL DECISION MAKING   5:32 PM I met with the patient to gather history and to perform my initial exam.  We discussed treatment options and the plan for care while in the Emergency Department. PPE: Provider wore gloves, N95 mask    8:42 PM Spoke with Dr. Farley, Neurology, regarding the patient.    8:58 PM Discussed the case with Dr. Bravo, Hospitalist, who agrees to accept the patient at this time.     Sarina Alfaro is a 72 year old female with relevant past history of pretension, hyperlipidemia, remote CVA without residual deficit who presents to the emergency department for evaluation of neck pain with bilateral arm and leg pain  Triage note reviewed:  Pt arrives with complaints of neck pain that started suddenly on Friday, she thought the symptoms were getting better on Saturday and now yesterday the pain ha spread down her back to her arms and legs.  Pt reports being unable to sleep or eat much for the past few days d/t the pain.     Triage Assessment     Row Name 09/05/22 1713       Triage Assessment (Adult)    Airway WDL WDL       Respiratory WDL    Respiratory WDL WDL       Skin Circulation/Temperature WDL    Skin Circulation/Temperature  "WDL WDL       Cardiac WDL    Cardiac WDL WDL       Peripheral/Neurovascular WDL    Peripheral Neurovascular WDL WDL       Cognitive/Neuro/Behavioral WDL    Cognitive/Neuro/Behavioral WDL WDL                Differential  diagnosis considered included but not limited to tranverse myelitis, discitis, epidural abscess, myositis, myasthenia gravis  Vital signs:/57   Pulse 99   Temp 97.6  F (36.4  C) (Temporal)   Resp 18   Ht 1.651 m (5' 5\")   Wt 77.1 kg (170 lb)   SpO2 93%   BMI 28.29 kg/m    Pertinent physical exam findings:  HEENT: Neck supple without meningeal irritation, no dorsal spinous process tenderness  Musculoskeletal: Diffuse tenderness to palpation of both the upper and lower extremity  Neuro: Cranial nerves III through XII are intact 4-5 motor strength of upper and lower extremities.  Reports normal sensation to light touch.  Deep tendon reflexes intact  Diagnostic studies:  Imaging:  MR Thoracic Spine w/o & w Contrast   Final Result   IMPRESSION:      CERVICAL SPINE MRI:   1.  Mild multilevel spondylosis with reactive inflammatory degenerative changes at the left C3-C4 facet. Associated surrounding soft tissue edema and left scalene muscular strain.   2.  No high-grade canal stenosis.   3.  Moderate left C4-5 foraminal stenosis.      THORACIC SPINE MRI:   1.  Unremarkable thoracic spine MRI for age.      MR Cervical Spine w/o & w Contrast   Final Result   IMPRESSION:      CERVICAL SPINE MRI:   1.  Mild multilevel spondylosis with reactive inflammatory degenerative changes at the left C3-C4 facet. Associated surrounding soft tissue edema and left scalene muscular strain.   2.  No high-grade canal stenosis.   3.  Moderate left C4-5 foraminal stenosis.      THORACIC SPINE MRI:   1.  Unremarkable thoracic spine MRI for age.      POC US ECHO LIMITED    (Results Pending)      Lab:  Labs Ordered and Resulted from Time of ED Arrival to Time of ED Departure   CBC WITH PLATELETS - Abnormal       Result " Value    WBC Count 4.8      RBC Count 3.16 (*)     Hemoglobin 10.8 (*)     Hematocrit 32.3 (*)      (*)     MCH 34.2 (*)     MCHC 33.4      RDW 13.1      Platelet Count 86 (*)    BASIC METABOLIC PANEL - Abnormal    Sodium 132 (*)     Potassium 3.8      Chloride 101      Carbon Dioxide (CO2) 18 (*)     Anion Gap 13      Urea Nitrogen 48 (*)     Creatinine 1.78 (*)     Calcium 8.7      Glucose 98      GFR Estimate 30 (*)    CRP INFLAMMATION - Abnormal    CRP 52.1 (*)    ERYTHROCYTE SEDIMENTATION RATE AUTO - Abnormal    Erythrocyte Sedimentation Rate 110 (*)    CK TOTAL - Abnormal     (*)    TROPONIN I - Normal    Troponin I 0.04     INFLUENZA A/B & SARS-COV2 PCR MULTIPLEX - Normal    Influenza A PCR Negative      Influenza B PCR Negative      RSV PCR Negative      SARS CoV2 PCR Negative     BASIC METABOLIC PANEL   CBC WITH PLATELETS      Interventions: IV morphine  Medical decision making: MRI of the cervical and thoracic spine negative for acute process.  Laboratory evaluation with elevated sed rate, CRP and CK consistent with myositis.  CK2 41, creatinine is 1.78 with GFR 30.  Patient will be admitted, IV fluid hydration and monitoring kidney function, neurology consultation in a.m.    ED INTERVENTIONS     Medications   lidocaine 1 % 0.1-1 mL (has no administration in time range)   lidocaine (LMX4) cream (has no administration in time range)   sodium chloride (PF) 0.9% PF flush 3 mL (3 mLs Intracatheter Given 9/5/22 2241)   sodium chloride (PF) 0.9% PF flush 3 mL (has no administration in time range)   melatonin tablet 1 mg (has no administration in time range)   lactated ringers infusion ( Intravenous Rate/Dose Verify 9/6/22 0126)   acetaminophen (TYLENOL) tablet 975 mg (975 mg Oral Given 9/6/22 0009)   HYDROmorphone (DILAUDID) half-tab 1 mg (1 mg Oral Given 9/6/22 0241)   senna-docusate (SENOKOT-S/PERICOLACE) 8.6-50 MG per tablet 1 tablet (1 tablet Oral Given 9/5/22 2239)     Or   senna-docusate  (SENOKOT-S/PERICOLACE) 8.6-50 MG per tablet 2 tablet ( Oral See Alternative 9/5/22 2239)   ondansetron (ZOFRAN ODT) ODT tab 4 mg (has no administration in time range)     Or   ondansetron (ZOFRAN) injection 4 mg (has no administration in time range)   allopurinol (ZYLOPRIM) tablet 100 mg (has no administration in time range)   clopidogrel (PLAVIX) tablet 75 mg (has no administration in time range)   fluticasone-vilanterol (BREO ELLIPTA) 100-25 MCG/INH inhaler 1 puff (has no administration in time range)   lisinopril (ZESTRIL) tablet 20 mg (has no administration in time range)   LORazepam (ATIVAN) injection 1 mg (1 mg Intravenous Given 9/5/22 1842)   gadobutrol (GADAVIST) injection 7 mL (7 mLs Intravenous Given 9/5/22 1929)   morphine (PF) injection 4 mg (4 mg Intravenous Given 9/5/22 2057)       DISCHARGE MEDICATIONS        Review of your medicines      UNREVIEWED medicines. Ask your doctor about these medicines      Dose / Directions   albuterol 108 (90 Base) MCG/ACT inhaler  Commonly known as: PROAIR HFA/PROVENTIL HFA/VENTOLIN HFA      Dose: 2 puff  Inhale 2 puffs into the lungs every 4 hours as needed  Refills: 0     allopurinol 100 MG tablet  Commonly known as: ZYLOPRIM      Dose: 100 mg  Take 100 mg by mouth daily  Refills: 0     clopidogrel 75 MG tablet  Commonly known as: PLAVIX      Dose: 75 mg  Take 75 mg by mouth daily  Refills: 0     docusate sodium 100 MG capsule  Commonly known as: COLACE  Used for: Acute gouty arthritis      Dose: 100 mg  [DOCUSATE SODIUM (COLACE) 100 MG CAPSULE] Take 1 capsule (100 mg total) by mouth every 12 (twelve) hours.  Quantity: 60 capsule  Refills: 0     fluticasone-salmeterol 250-50 MCG/DOSE inhaler  Commonly known as: ADVAIR      Dose: 1 puff  Inhale 1 puff into the lungs 2 times daily  Refills: 0     lisinopril 20 MG tablet  Commonly known as: ZESTRIL      Dose: 20 mg  Take 20 mg by mouth daily  Refills: 0     oxyCODONE-acetaminophen 5-325 MG tablet  Commonly known as:  "PERCOCET  Used for: Acute gouty arthritis      Dose: 1 tablet  [OXYCODONE-ACETAMINOPHEN (PERCOCET/ENDOCET) 5-325 MG PER TABLET] Take 1 tablet by mouth every 6 (six) hours as needed for pain.  Quantity: 12 tablet  Refills: 0     simvastatin 20 MG tablet  Commonly known as: ZOCOR      Dose: 20 mg  Take 20 mg by mouth daily  Refills: 0              INFORMATION SOURCE AND LIMITATIONS    History/Exam limitations: N/A  Patient information was obtained from: Patient  Use of : N/A    HISTORY OF PRESENT ILLNESS   Sarina Alfaro is a 72 year old year old female with a relevant past history of cerebral vascular accident, hypertension, hyperlipidemia, who presents to this ED for evaluation of extremity and neck weakness and pain.    The patient explains constant pain and weakness in her legs, arms, upper back, and neck. She reports the pain started Thursday in her neck and a little in her back. Friday morning she noted that the left side of her neck hurt worse than the day before. The patient reports feeling a little better Saturday and Sunday during the day time. Sunday night her symptoms worsened and she had pain and weakness in her arms and all the way down her legs. The patient explains that she feels weak and walks slowly secondary to the weakness and pain. When going up the stairs the patient describes extreme pain and has to take a step at a time to come back down the stairs. She also explains having difficulty getting up from a chair. Patient denies headache, difficulty talking, and chest pain, but does confirm difficulty breathing which the patient associates with her asthma. Of note, the patient is on simvastatin.    The patient reports getting new glasses around 4 weeks ago with new bifocals that \"just don't feel right.\" She explains her vision is blurry with these glasses, but is improved when she wears her old glasses.       REVIEW OF SYSTEMS:   Constitutional: Negative for  fever.   HENT: Negative for " URI symptoms or sore throat.    Cardiac: Negative for  chest pain,palpitations, near syncope or syncope  Respiratory: Negative for cough Positive for shortness of breath (baseline).  Gastrointestinal: Negative for abdominal pain, nausea, vomiting, constipation, diarrhea, rectal bleeding or melena.  Genitourinary: Negative for dysuria, flank pain and hematuria.   Musculoskeletal: Positive for arm pain, leg pain, upper back pain and neck pain.   Skin: Negative for rash  Neurological: Negative for dizziness, headache, syncope, speech difficulty, unilateral weakness or imbalance with walking. Positive for weakness in extremities  Hematological: Negative for adenopathy. Does not bruise/bleed easily.   Psychiatric/Behavioral: Negative for confusion.       PATIENT HISTORY     Past Medical History:   Diagnosis Date     CVA (cerebral vascular accident) (H)      Dyslipidemia      Patient Active Problem List   Diagnosis     CVA (cerebral vascular accident) (H)     Diaphragmatic hernia     Dyslipidemia     Hemorrhoids     Hypertension     Iron deficiency anemia     Myositis of multiple sites, unspecified myositis type     Myositis     Past Surgical History:   Procedure Laterality Date     A-V CARDIAC PACEMAKER INSERTION       Social Histrory  Smoking:None  Alcohol Use:None  No Known Allergies      OUTPATIENT MEDICATIONS     New Prescriptions    No medications on file      Vitals:    09/06/22 0000 09/06/22 0015 09/06/22 0030 09/06/22 0130   BP: 125/59  110/88 119/57   Pulse: 99 97 102 99   Resp:   18    Temp:       TempSrc:       SpO2: 92% 93% 92% 93%   Weight:       Height:           Physical Exam   Constitutional: Oriented to person, place, and time. Appears well-developed and well-nourished.   HEENT:    Head: Atraumatic.   Neck: Normal range of motion. Neck supple.   Cardiovascular: Normal rate, regular rhythm and normal heart sounds.    Pulmonary/Chest: Normal effort  and breath sounds normal.   Abdominal: Soft. Bowel sounds  are normal.   Musculoskeletal: Normal range of motion. Diffuse muscle tenderness in upper and lower extremities  Neurological: Alert and oriented to person, place, and time. Normal strength.No sensory deficit. No cranial nerve deficit. 5/5 motor strength and normal sensation.   Skin: Skin is warm and dry.   Psychiatric: Normal mood and affect. Behavior is normal. Thought content normal.       DIAGNOSTICS    LABORATORY FINDINGS (REVIEWED AND INTERPRETED):  Labs Ordered and Resulted from Time of ED Arrival to Time of ED Departure   CBC WITH PLATELETS - Abnormal       Result Value    WBC Count 4.8      RBC Count 3.16 (*)     Hemoglobin 10.8 (*)     Hematocrit 32.3 (*)      (*)     MCH 34.2 (*)     MCHC 33.4      RDW 13.1      Platelet Count 86 (*)    BASIC METABOLIC PANEL - Abnormal    Sodium 132 (*)     Potassium 3.8      Chloride 101      Carbon Dioxide (CO2) 18 (*)     Anion Gap 13      Urea Nitrogen 48 (*)     Creatinine 1.78 (*)     Calcium 8.7      Glucose 98      GFR Estimate 30 (*)    CRP INFLAMMATION - Abnormal    CRP 52.1 (*)    ERYTHROCYTE SEDIMENTATION RATE AUTO - Abnormal    Erythrocyte Sedimentation Rate 110 (*)    CK TOTAL - Abnormal     (*)    TROPONIN I - Normal    Troponin I 0.04     INFLUENZA A/B & SARS-COV2 PCR MULTIPLEX - Normal    Influenza A PCR Negative      Influenza B PCR Negative      RSV PCR Negative      SARS CoV2 PCR Negative     BASIC METABOLIC PANEL   CBC WITH PLATELETS         IMAGING (REVIEWED AND INTERPRETED):  MR Thoracic Spine w/o & w Contrast   Final Result   IMPRESSION:      CERVICAL SPINE MRI:   1.  Mild multilevel spondylosis with reactive inflammatory degenerative changes at the left C3-C4 facet. Associated surrounding soft tissue edema and left scalene muscular strain.   2.  No high-grade canal stenosis.   3.  Moderate left C4-5 foraminal stenosis.      THORACIC SPINE MRI:   1.  Unremarkable thoracic spine MRI for age.      MR Cervical Spine w/o & w Contrast    Final Result   IMPRESSION:      CERVICAL SPINE MRI:   1.  Mild multilevel spondylosis with reactive inflammatory degenerative changes at the left C3-C4 facet. Associated surrounding soft tissue edema and left scalene muscular strain.   2.  No high-grade canal stenosis.   3.  Moderate left C4-5 foraminal stenosis.      THORACIC SPINE MRI:   1.  Unremarkable thoracic spine MRI for age.      POC US ECHO LIMITED    (Results Pending)           ECG (REVIEWED AND INTERPRETED):   ECG:   Performed at: 1803  HR:  103 bpm  Rhythm: Sinus  ST changes: No ST segment elevation or depression, no T wave inversion,No Q wave  Interpretation: Sinus tachycardia with unifocal PVCs.   No prior for comparison    I have reviewed the patient's ECG, with comments made as listed above. Please see scanned image for full interpretation.         I, Leslie Deutsch, am serving as a scribe to document services personally performed by Chet Willams D.O., based on my observation and the provider s statements to me.    I, Chet Willams D.O., attest that Leslie Deutsch is acting in a scribe capacity, has observed my performance of the services and has documented them in accordance with my direction.    Chet Willams D.O.  EMERGENCY MEDICINE   09/05/22  [facility] EMERGENCY DEPARTMENT  [facility] [facility] [id number] [phone]  Dept: [phone]       Chet Willams DO  09/06/22 0251

## 2022-09-05 NOTE — ED TRIAGE NOTES
Pt arrives with complaints of neck pain that started suddenly on Friday, she thought the symptoms were getting better on Saturday and now yesterday the pain ha spread down her back to her arms and legs.  Pt reports being unable to sleep or eat much for the past few days d/t the pain.     Triage Assessment     Row Name 09/05/22 3705       Triage Assessment (Adult)    Airway WDL WDL       Respiratory WDL    Respiratory WDL WDL       Skin Circulation/Temperature WDL    Skin Circulation/Temperature WDL WDL       Cardiac WDL    Cardiac WDL WDL       Peripheral/Neurovascular WDL    Peripheral Neurovascular WDL WDL       Cognitive/Neuro/Behavioral WDL    Cognitive/Neuro/Behavioral WDL WDL

## 2022-09-06 PROBLEM — D69.6 THROMBOCYTOPENIA (H): Status: ACTIVE | Noted: 2022-01-01

## 2022-09-06 PROBLEM — E87.1 HYPONATREMIA: Status: ACTIVE | Noted: 2022-01-01

## 2022-09-06 PROBLEM — C50.919 BREAST CANCER (H): Status: ACTIVE | Noted: 2022-01-01

## 2022-09-06 PROBLEM — N18.30 CKD (CHRONIC KIDNEY DISEASE) STAGE 3, GFR 30-59 ML/MIN (H): Status: ACTIVE | Noted: 2022-01-01

## 2022-09-06 PROBLEM — M10.9 GOUT: Status: ACTIVE | Noted: 2022-01-01

## 2022-09-06 PROBLEM — D53.9 MACROCYTIC ANEMIA: Status: ACTIVE | Noted: 2022-01-01

## 2022-09-06 PROBLEM — J45.909 ASTHMA: Status: ACTIVE | Noted: 2022-01-01

## 2022-09-06 PROBLEM — N17.9 AKI (ACUTE KIDNEY INJURY) (H): Status: ACTIVE | Noted: 2022-01-01

## 2022-09-06 NOTE — PROGRESS NOTES
"   09/06/22 1306   Quick Adds   Type of Visit Initial PT Evaluation   Living Environment   People in Home spouse   Current Living Arrangements house   Home Accessibility stairs to enter home   Number of Stairs, Main Entrance 4   Stair Railings, Main Entrance railings safe and in good condition   Self-Care   Regular Exercise No   Equipment Currently Used at Home none   Fall history within last six months no   Activity/Exercise/Self-Care Comment Pt independent with ADLs, spouse drives and assist with medications.   General Information   Onset of Illness/Injury or Date of Surgery 09/05/22   Referring Physician Nataly Bravo MD   Patient/Family Therapy Goals Statement (PT) None stated.   Pertinent History of Current Problem (include personal factors and/or comorbidities that impact the POC) Per H&P: \"72 year old female with past medical history significant for hypertension, dyslipidemia, and CVA who was admitted on 9/5/2022 for myositis. She developed acute neck and shoulder pain on Friday which progressively worsened and included her leg muscles as well.\"   Existing Precautions/Restrictions fall   Cognition   Affect/Mental Status (Cognition) low arousal/lethargic   Orientation Status (Cognition) disoriented to;place;situation;time   Follows Commands (Cognition) follows one-step commands;delayed response/completion;increased processing time needed;repetition of directions required;verbal cues/prompting required   Range of Motion (ROM)   Range of Motion ROM deficits secondary to weakness   Strength (Manual Muscle Testing)   Strength (Manual Muscle Testing) Deficits observed during functional mobility   Transfers   Transfers sit-stand transfer   Transfer Safety Concerns Noted decreased weight-shifting ability   Impairments Contributing to Impaired Transfers impaired balance;decreased strength   Comment, (Transfers) Pt performs seated scoot around EOC with mod assist of 1.   Sit-Stand Transfer   Sit-Stand " [place] (Transfers) maximum assist (25% patient effort);2 person assist   Assistive Device (Sit-Stand Transfers) other (see comments)  (arm-in-arm)   Comment, (Sit-Stand Transfer) First attempt with FWW, pt unable to achieve standing position. Second and third attempts with arm-in-arm. Cueing for posture once in standing.   Clinical Impression   Criteria for Skilled Therapeutic Intervention Yes, treatment indicated   PT Diagnosis (PT) impaired functional mobility   Influenced by the following impairments decreased strength, impaired balance   Functional limitations due to impairments transfers, balance   Clinical Presentation (PT Evaluation Complexity) Evolving/Changing   Clinical Presentation Rationale Pt presents as medically diagnosed.   Clinical Decision Making (Complexity) moderate complexity   Planned Therapy Interventions (PT) balance training;bed mobility training;gait training;home exercise program;neuromuscular re-education;patient/family education;strengthening;transfer training   Risk & Benefits of therapy have been explained evaluation/treatment results reviewed;participants voiced agreement with care plan;participants included;patient   PT Discharge Planning   PT Discharge Recommendation (DC Rec) Transitional Care Facility   PT Rationale for DC Rec Pt requires assist of 2 for transfers.   Total Evaluation Time   Total Evaluation Time (Minutes) 20   Physical Therapy Goals   PT Frequency Daily   PT Predicted Duration/Target Date for Goal Attainment 09/13/22   PT Goals Transfers;Gait   PT: Transfers Minimal assist;Sit to/from stand;Assistive device   PT: Gait Minimal assist;Assistive device;Rolling walker;10 feet     Graciela Venegas, PT  9/6/2022

## 2022-09-06 NOTE — ED NOTES
Pt c/o her butt is hurting while in bed from the way she is positioned. Offered recliner and per pt it is better. Pt appears comfortable on the recliner.

## 2022-09-06 NOTE — PROGRESS NOTES
09/06/22 1305   Quick Adds   Type of Visit Initial Occupational Therapy Evaluation   Living Environment   People in Home spouse   Current Living Arrangements house   Living Environment Comments pt had difficulty answer questions, requiring increased time and repetition   Self-Care   Regular Exercise No   Activity/Exercise/Self-Care Comment ind for ADLs   Instrumental Activities of Daily Living (IADL)   IADL Comments need to confirm when pt more able to provide information: assist with meds, ind for meal prep and household management   General Information   Onset of Illness/Injury or Date of Surgery 09/05/22   Referring Physician Nataly Bravo MD   Patient/Family Therapy Goal Statement (OT) none stated   Additional Occupational Profile Info/Pertinent History of Current Problem admitted with myositis, CORNELIUS; PMH CVA   Existing Precautions/Restrictions fall   Limitations/Impairments safety/cognitive   Cognitive Status Examination   Orientation Status person  (not oriented to month, date, year, or name of place; oriented to type of place)   Affect/Mental Status (Cognitive) low arousal/lethargic   Follows Commands follows one-step commands;50-74% accuracy;delayed response/completion;increased processing time needed;verbal cues/prompting required;repetition of directions required   Cognitive Status Comments significant increased time to process information, answer questions, and complete tasks   Pain Assessment   Patient Currently in Pain Yes, see Vital Sign flowsheet   Range of Motion Comprehensive   Comment, General Range of Motion bilateral shoulders limited 70 degrees AROM d/t pain, elbow/wrist/hand WNL   Strength Comprehensive (MMT)   Comment, General Manual Muscle Testing (MMT) Assessment BUE and  significantly weak   Coordination   Fine Motor Coordination significant impairment in opposition   Bed Mobility   Comment (Bed Mobility) not tested   Transfers   Transfers sit-stand transfer   Sit-Stand Transfer    Sit-Stand [place] (Transfers) maximum assist (25% patient effort);2 person assist   Sit/Stand Transfer Comments arm in arm, requiring max VC   Balance   Balance Comments modA of 2 people to maintain standing balance   Activities of Daily Living   BADL Assessment/Intervention lower body dressing;toileting;grooming   Lower Body Dressing Assessment/Training   Comment, (Lower Body Dressing) not formally tested but pt would require significant assist d/t current weakness, motor planning deficits, and difficulty following commands   [place] Level (Lower Body Dressing) maximum assist (25% patient effort);assist of 2   Grooming Assessment/Training   [place] Level (Grooming) moderate assist (50% patient effort)   Comment, (Grooming) not formally tested but today pt requires mod VC to initiate tasks and physical assist to touch hands to face   Toileting   Comment, (Toileting) not formally tested but pt requires max A of 2 for sit <> stand transfer   [place] Level (Toileting) maximum assist (25% patient effort);assist of 2   Clinical Impression   Criteria for Skilled Therapeutic Interventions Met (OT) Yes, treatment indicated   OT Diagnosis dec ADL indep   OT Problem List-Impairments impacting ADL problems related to;activity tolerance impaired;balance;cognition;coordination;mobility;range of motion (ROM);strength;motor control;pain   Assessment of Occupational Performance 5 or more Performance Deficits   Identified Performance Deficits grooming, toileting, dressing, bathing, meal preparation, household management   Planned Therapy Interventions (OT) ADL retraining;IADL retraining;balance training;bed mobility training;cognition;strengthening;transfer training;progressive activity/exercise;home program guidelines   Clinical Decision Making Complexity (OT) moderate complexity   Risk & Benefits of therapy have been explained evaluation/treatment results reviewed;participants included;patient   OT Discharge  Planning   OT Discharge Recommendation (DC Rec) Transitional Care Facility   OT Rationale for DC Rec requires assist of 2 for sit <> stand transfer and all standing ADLs. requires assist of 1 for seated ADLs   Total Evaluation Time (Minutes)   Total Evaluation Time (Minutes) 20   OT Goals   Therapy Frequency (OT) Daily   OT Predicted Duration/Target Date for Goal Attainment 09/13/22   OT Goals Hygiene/Grooming;Lower Body Dressing;Toilet Transfer/Toileting;Cognition   OT: Hygiene/Grooming supervision/stand-by assist  (while sitting)   OT: Lower Body Dressing Moderate assist   OT: Toilet Transfer/Toileting Moderate assist;toilet transfer;cleaning and garment management   OT: Cognitive Patient/caregiver will verbalize understanding of cognitive assessment results/recommendations as needed for safe discharge planning

## 2022-09-06 NOTE — ED NOTES
Pt still does not have the urge to urinate.  Bladder scan done it was 174 ml. Instructed to call for help if able to urinate to be assisted.  Pt still prefers the recliner and does not want to switch to a bed.  Pt assisted to be repositioned and elevated both legs on a pillow for comfort.  Maintained on O2 at 1-2 LPM per NC and at 92-93%.  O2 sats at RA still 91% and pt is drowsy probably from the Dilaudid dose given earlier.  Call light within reach   (1) More than 48 hours/None

## 2022-09-06 NOTE — ED NOTES
"ER Boarding Note:    From home, lives with spouse. Pt is now sitting upright in bed, more alert than she was at shift change (1500). Continues on supplemental oxygen at 2 LPM via NC for low O2 earlier today per handoff report after pt received dilaudid for pain control. Pt was drowsy at shift change and sleeps with eyes open. Difficulty with lower extremity movement, pt stated \"I need help moving my leg\". Bilateral lower extremity drift noted. Bilateral hand  weak. Pt is alert to situation, place, and self. Decreased appetite per handoff report pt only consumed ice cream at lunch. Gave patient menu to order food for diner. Pt is c/o increase in pain 8/10 \"all over\". Pt is a full assist with bed mobility. Spouse at bedside.     Drowsy with intermittent confusion (time and situation)  On 2 LPM via NC satting at 95%  Jackson patent strict (I/O)  Sodium Bicarb 100 mEq in D5W at 100 ml/hr  Needs assist x2 with ambulation from bed to bedside commode or chair.   Generalized swelling noted in upper and lower extremities. Tender to touch.   Ultrasound guided IV start Left arm 18G. Pt has hx of breast cancer with mastectomy.           Vital signs:  Temp: 98.7  F (37.1  C) Temp src: Oral BP: 118/57 Pulse: 116   Resp: 18 SpO2: 95 % O2 Device: Nasal cannula Oxygen Delivery: 2 LPM Height: 165.1 cm (5' 5\") Weight: 77.1 kg (170 lb)  Estimated body mass index is 28.29 kg/m  as calculated from the following:    Height as of this encounter: 1.651 m (5' 5\").    Weight as of this encounter: 77.1 kg (170 lb).             "

## 2022-09-06 NOTE — H&P
[facility]    History and Physical - Hospitalist Service       Date of Admission:  9/5/2022    Assessment & Plan      Sarina Alfaro is a 72 year old female with past medical history significant for hypertension, dyslipidemia, and CVA who was admitted on 9/5/2022 for myositis. She developed acute neck and shoulder pain on Friday which progressively worsened and included her leg muscles as well.    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  -Baseline creatinine appears to be ~1.0-1.2  -Labs on admission 9/6: Creatinine 1.78, BUN 48  -LR at 50 mL/h  -Will monitor    Hyponatremia, asymptomatic  -IV fluids as above, Will monitor    Chronic normocytic anemia  -Likely at baseline, will monitor  -Had protein electrophoresis and blood morphology analysis in 2021    Asthma, COPD  -On Advair at home, substitute for Breo while inpatient    Essential hypertension  -Lisinopril 20 mg daily    Hyperlipidemia  -Holding simvastatin given myositis    Gout  -Allopurinol 100 mg daily    History of CVA  -On Plavix 75 mg daily  -CVA was in 2012, unsure why continued on Plavix versus aspirin     Diet: Regular Diet Adult  DVT Prophylaxis: Pneumatic Compression Devices  Jackson Catheter: Not present  Central Lines: None  Cardiac Monitoring: None  Code Status: Full Code      Clinically Significant Risk Factors Present on Admission         # Hyponatremia: Na = 132 mmol/L (Ref range: 136 - 145 mmol/L) on admission, will monitor as appropriate        # Platelet Defect: home medication list includes an antiplatelet  "medication  # Hypertension: home medication list includes antihypertensive(s)    # Overweight: Estimated body mass index is 28.29 kg/m  as calculated from the following:    Height as of this encounter: 1.651 m (5' 5\").    Weight as of this encounter: 77.1 kg (170 lb).        Disposition Plan      Expected Discharge Date: 09/07/2022,  3:00 PM              The patient's care was discussed with the Patient.    Nataly Bravo MD  Hospitalist Service  [facility]  Securely message with the Vocera Web Console (learn more here)  Text page via [facility] Paging/Directory         ______________________________________________________________________    Chief Complaint   Weakness shoulder pain    History is obtained from the patient    History of Present Illness   Sarina Alfaro is a 72 year old female who developed acute pain in her upper back, shoulders, and neck on Friday morning.  She was having pain mainly in the neck when it began however it spread across her shoulders and down her arms.  Over the weekend it began to start in her legs and radiates down her legs.  She has had significant associated weakness and difficulty walking due to the pain.  She is unable to do regular things without pain including getting up from a chair, going up and down stairs, and using her shoulders.  She also expresses difficulty sleeping over the past few nights due to the pain.  She does appear fatigued on exam.  She takes simvastatin for dyslipidemia so that is on hold.  ED consulted neurology who is going to do an initial work-up for myositis.  She may require rheumatology which would require transfer.    Review of Systems    The 10 point Review of Systems is negative other than noted in the HPI.    Past Medical History    I have reviewed this patient's medical history and updated it with pertinent information if needed.   Past Medical History:   Diagnosis Date     CVA (cerebral vascular accident) (H)      " Dyslipidemia        Past Surgical History   I have reviewed this patient's surgical history and updated it with pertinent information if needed.  Past Surgical History:   Procedure Laterality Date     A-V CARDIAC PACEMAKER INSERTION         Social History   I have reviewed this patient's social history and updated it with pertinent information if needed.  Social History     Tobacco Use     Smoking status: Former Smoker     Quit date: 1996     Years since quittin.6     Smokeless tobacco: Never Used       Family History     No significant family history    Prior to Admission Medications   Prior to Admission Medications   Prescriptions Last Dose Informant Patient Reported? Taking?   albuterol (PROAIR HFA/PROVENTIL HFA/VENTOLIN HFA) 108 (90 Base) MCG/ACT inhaler   Yes Yes   Sig: Inhale 2 puffs into the lungs every 4 hours as needed   allopurinol (ZYLOPRIM) 100 MG tablet 2022 at Unknown time  Yes Yes   Sig: Take 100 mg by mouth daily   clopidogrel (PLAVIX) 75 MG tablet 2022 at Unknown time  Yes Yes   Sig: Take 75 mg by mouth daily   docusate sodium (COLACE) 100 MG capsule Not Taking at Unknown time  No No   Sig: [DOCUSATE SODIUM (COLACE) 100 MG CAPSULE] Take 1 capsule (100 mg total) by mouth every 12 (twelve) hours.   Patient not taking: Reported on 2022   fluticasone-salmeterol (ADVAIR) 250-50 MCG/DOSE inhaler 2022 at x1  Yes Yes   Sig: Inhale 1 puff into the lungs 2 times daily   lisinopril (ZESTRIL) 20 MG tablet 2022 at Unknown time  Yes Yes   Sig: Take 20 mg by mouth daily   oxyCODONE-acetaminophen (PERCOCET/ENDOCET) 5-325 mg per tablet Not Taking at Unknown time  No No   Sig: [OXYCODONE-ACETAMINOPHEN (PERCOCET/ENDOCET) 5-325 MG PER TABLET] Take 1 tablet by mouth every 6 (six) hours as needed for pain.   Patient not taking: Reported on 2022   simvastatin (ZOCOR) 20 MG tablet 2022 at Unknown time  Yes Yes   Sig: Take 20 mg by mouth daily      Facility-Administered Medications: None      Allergies   No Known Allergies    Physical Exam   Vital Signs: Temp: 97.6  F (36.4  C) Temp src: Temporal BP: 119/57 Pulse: 99   Resp: 18 SpO2: 93 % O2 Device: None (Room air)    Weight: 170 lbs 0 oz  Constitutional: awake, alert, cooperative, no apparent distress, and appears stated age, sleepy  Eyes: Lids and lashes normal, pupils equal, round, extra ocular muscles intact, sclera clear, conjunctiva normal  Respiratory: No increased work of breathing, good air exchange, clear to auscultation bilaterally, no crackles or wheezing  Cardiovascular: Normal apical impulse, regular rate and rhythm, and no murmur noted  GI: normal bowel sounds, soft, non-distended, non-tender  Skin: Redness across the shoulders and upper chest but no rash noted, warm to touch  Musculoskeletal: no lower extremity pitting edema present  tone is normal, generalized weakness, tender to palpation in the shoulders and thighs  Neurologic: Awake, alert, No gross focal abnormalities   Neuropsychiatric: Appropriate mood and affect    Data   Data reviewed today: I reviewed all medications, new labs and imaging results over the last 24 hours. I personally reviewed no images or EKG's today.    Recent Labs   Lab 09/05/22  1743   WBC 4.8   HGB 10.8*   *   PLT 86*   *   POTASSIUM 3.8   CHLORIDE 101   CO2 18*   BUN 48*   CR 1.78*   ANIONGAP 13   RICHY 8.7   GLC 98     Recent Results (from the past 24 hour(s))   MR Cervical Spine w/o & w Contrast    Narrative    EXAM: MR CERVICAL SPINE W/O and W CONTRAST, MR THORACIC SPINE W/O and W CONTRAST  LOCATION: [facility]  DATE/TIME: 9/5/2022 7:50 PM    INDICATION: Neck and upper back pain with bilateral extremity weakness and pain.   COMPARISON: None.  CONTRAST: 7 mL Gadavist.  TECHNIQUE:   1) MRI Cervical Spine without and with IV contrast.  2) MRI Thoracic Spine without and with IV contrast.    FINDINGS:    CERVICAL SPINE:   There are a few levels of minimal degenerative  anterolisthesis. No findings to suggest an acute compression fracture. Degenerative endplate edema (Modic type I) at C3-C4. Left greater than right facet degenerative changes at C3-C4 with associated fluid   in the left C3-C4 facet joint. Edema surrounding the left C3-C4 joint. Asymmetric left scalene edema/strain with associated reactive enhancement. No abnormal cord signal. No abnormal intrathecal enhancement.    Prevertebral soft tissues are unremarkable. Major vascular flow voids in the neck are maintained. The imaged posterior fossa is unremarkable.    CRANIOVERTEBRAL JUNCTION AND C1-C2: Widely patent.    C2-C3: Small disc and osteophyte complex. Severe left facet arthropathy. No significant canal stenosis. Mild left foraminal stenosis.     C3-C4: Right eccentric disc and osteophyte complex. Right uncovertebral spurring. Moderate bilateral facet arthropathy. No significant canal stenosis. Mild to moderate right foraminal stenosis.    C4-C5: Disc and osteophyte complex. Left uncovertebral spurring. Mild bilateral facet arthropathy. No significant canal stenosis. Moderate left and mild right foraminal stenosis.     C5-C6: Disc and osteophyte complex. Bilateral uncovertebral spurring. Moderate left greater than right facet arthropathy. No significant canal stenosis. Mild bilateral foraminal stenosis.     C6-C7: Small disc and osteophyte complex. No significant canal or foraminal stenosis.     C7-T1: Small disc and osteophyte complex. No significant canal or foraminal stenosis.    THORACIC SPINE:  Scoliotic curvature. Minimal anterolisthesis of T2 on T3. No acute compression fracture. Mild multilevel spondylosis with associated kyphosis in the mid thoracic spine. No aggressive marrow signal abnormality. No significant canal or foraminal stenosis.   No abnormal cord signal. No abnormal intrathecal enhancement.    Small hiatal hernia. Prevertebral and dorsal paraspinal soft tissues are unremarkable.      Impression     IMPRESSION:    CERVICAL SPINE MRI:  1.  Mild multilevel spondylosis with reactive inflammatory degenerative changes at the left C3-C4 facet. Associated surrounding soft tissue edema and left scalene muscular strain.  2.  No high-grade canal stenosis.  3.  Moderate left C4-5 foraminal stenosis.    THORACIC SPINE MRI:  1.  Unremarkable thoracic spine MRI for age.   MR Thoracic Spine w/o & w Contrast    Narrative    EXAM: MR CERVICAL SPINE W/O and W CONTRAST, MR THORACIC SPINE W/O and W CONTRAST  LOCATION: [facility]  DATE/TIME: 9/5/2022 7:50 PM    INDICATION: Neck and upper back pain with bilateral extremity weakness and pain.   COMPARISON: None.  CONTRAST: 7 mL Gadavist.  TECHNIQUE:   1) MRI Cervical Spine without and with IV contrast.  2) MRI Thoracic Spine without and with IV contrast.    FINDINGS:    CERVICAL SPINE:   There are a few levels of minimal degenerative anterolisthesis. No findings to suggest an acute compression fracture. Degenerative endplate edema (Modic type I) at C3-C4. Left greater than right facet degenerative changes at C3-C4 with associated fluid   in the left C3-C4 facet joint. Edema surrounding the left C3-C4 joint. Asymmetric left scalene edema/strain with associated reactive enhancement. No abnormal cord signal. No abnormal intrathecal enhancement.    Prevertebral soft tissues are unremarkable. Major vascular flow voids in the neck are maintained. The imaged posterior fossa is unremarkable.    CRANIOVERTEBRAL JUNCTION AND C1-C2: Widely patent.    C2-C3: Small disc and osteophyte complex. Severe left facet arthropathy. No significant canal stenosis. Mild left foraminal stenosis.     C3-C4: Right eccentric disc and osteophyte complex. Right uncovertebral spurring. Moderate bilateral facet arthropathy. No significant canal stenosis. Mild to moderate right foraminal stenosis.    C4-C5: Disc and osteophyte complex. Left uncovertebral spurring. Mild bilateral facet  arthropathy. No significant canal stenosis. Moderate left and mild right foraminal stenosis.     C5-C6: Disc and osteophyte complex. Bilateral uncovertebral spurring. Moderate left greater than right facet arthropathy. No significant canal stenosis. Mild bilateral foraminal stenosis.     C6-C7: Small disc and osteophyte complex. No significant canal or foraminal stenosis.     C7-T1: Small disc and osteophyte complex. No significant canal or foraminal stenosis.    THORACIC SPINE:  Scoliotic curvature. Minimal anterolisthesis of T2 on T3. No acute compression fracture. Mild multilevel spondylosis with associated kyphosis in the mid thoracic spine. No aggressive marrow signal abnormality. No significant canal or foraminal stenosis.   No abnormal cord signal. No abnormal intrathecal enhancement.    Small hiatal hernia. Prevertebral and dorsal paraspinal soft tissues are unremarkable.      Impression    IMPRESSION:    CERVICAL SPINE MRI:  1.  Mild multilevel spondylosis with reactive inflammatory degenerative changes at the left C3-C4 facet. Associated surrounding soft tissue edema and left scalene muscular strain.  2.  No high-grade canal stenosis.  3.  Moderate left C4-5 foraminal stenosis.    THORACIC SPINE MRI:  1.  Unremarkable thoracic spine MRI for age.

## 2022-09-06 NOTE — PROGRESS NOTES
Assessment & Plan   72-year-old female with breast cancer, previous CVA, HTN, CKD presents with CORNELIUS on progressive muscle pain and weakness concerning for rheumatological etiology    Active Problems:    Benign essential hypertension    Myositis of multiple sites, unspecified myositis type    Myositis    CORNELIUS (acute kidney injury) (H)    CKD (chronic kidney disease) stage 3, GFR 30-59 ml/min (H)    Breast cancer (H)    Asthma    Gout    Hyponatremia    Thrombocytopenia (H)    Macrocytic anemia    Present on Admission:    Myositis of multiple sites, unspecified myositis type    Myositis      Progressive muscle pain and weakness  -Patient presented with neck, shoulder, back and upper extremity pain that has begun to radiate down to her legs with associated weakness.  Initial MRI cervical and thoracic spine 9/5/2022 with reactive inflammatory degenerative changes of the left C3-C4 facet with associated soft tissue edema, mild multilevel spondylosis but otherwise no high-grade spinal canal or foraminal stenosis.  CK only mildly elevated at 241, ESR and CRP both grossly elevated.  Suspect rheumatological etiology versus neurological.  Differentials include polymyalgia rheumatica, myositis (unlikely dermatomyositis but could be polymyositis, viral induced myositis), vasculitis, myasthenia gravis.  -Hold home statin  -Anti-Natali, anti-Ro, anti-La, polymyositis/dermatomyositis panel, myasthenia panel, antimitochondrial DNA, F actin, INO, TSH  -[facility] 9/6: Dr Margarito Linder:     -Recommended also aldolase, ANCA, peripheral smear, CXR, C3/C4.  Sed rate >100 should consider intravascular infectious diseases like osteomyelitis, vasculitis (especially with CORNELIUS), malignancy like leukemia, large vessel vasculitis (no vision changes or headache in this patient so less likely).  Endorses that it seems a little bit early for steroids but if chest xray is normal, UA is normal without WBC or RBC, no infection found and other labs  unrevealing did recommend prednisone 20mg po daily (if CT then would see dramatic affect on pain within 24 hours).     -Consider later temporal artery ultrasound, but no headache or vision changes to suggest temporal arteritis  -CXR 9/6 with atelectasis and scarring in both lungs but without nodules, consolidations, masses.  -Would not use diluadid for pain control, causing a lot of lethargy.  Try small dose oxycodon  -Neurology consulted, appreciate recommendations    CORNELIUS on CKD 3, metabolic acidosis  -Baseline creatinine difficult to ascertain as no labs in the past year but likely close to 1.  Concern for possible vasculitis, although less likely pulmonary renal syndrome.  -Renal US  -Check UA  -Jackson catheter placed, patient with minimal urine output and distal allow us to get better I's and O's.  -Hold home lisinopril  -Avoid other nephrotoxins  -IVF + bicarbinate  -Neprhology consulted, appreciate recommendations    Acute metabolic encephalopathy  -Patient very sleepy, difficult to have conversation with.  Likely from Dilaudid that was recently given with CORNELIUS but with above questionable vasculitis/rheumatological picture would want to rule out CVA.  -CT head without contrast  -Dilaudid    Previous CVA  -Hold home simvastatin given possible myositis  -Clopidogrel 75 mg p.o. daily    Breast cancer  -Previous mastectomy    HTN  -Hold home lisinopril given CORNELIUS  -Hydralazine p.o. as needed    Asthma  -Not in acute exacerbation  -Breo Ellipta daily  -Albuterol as needed    Gout  -Not in acute exacerbation  -Allopurinol 100 mg p.o. daily    Hyponatremia  -Likely hypovolemic hyponatremia as patient also with CORNELIUS.  -IVF    Thrombocytopenia  -Appears to be new for patient, potentially reactive to current myositis    Macrocytic anemia  -Hemoglobin 10.8 with an MCV of 102  -TSH, B12, folic acid     Clinically Significant Risk Factors Present on Admission         # Hyponatremia: Na = 131 mmol/L (Ref range: 136 - 145  "mmol/L) on admission, will monitor as appropriate      # Acute Kidney Injury, unspecified: based on a >150% or 0.3 mg/dL increase in creatinine on admission compared to past 90 day average, will monitor renal function   # Platelet Defect: home medication list includes an antiplatelet medication  # Hypertension: home medication list includes antihypertensive(s)    # Overweight: Estimated body mass index is 28.29 kg/m  as calculated from the following:    Height as of this encounter: 1.651 m (5' 5\").    Weight as of this encounter: 77.1 kg (170 lb).        Electrolytes: sodium 131  Fluids: Sodium bicarb + D5 @ 100 ml/hr  Diet: regular  VTE prophylaxis: SCD    COVID19 symptom check  9/6/2022  Fevers/Chills/myalgias: NEGATIVE TO ALL  Sick contacts/COVID19 exposure: NEGATIVE TO ALL  Cough/trouble breathing/SOB/sore throat: NEGATIVE TO ALL  Diarrhea: NEGATIVE       Expected Discharge Date: 09/07/2022,  3:00 PM               Subjective  Cc: pain    Pt is new to be today.  Personally conducted extensive chart review prior to visit including labs, imaging, past provider notes and interventions.  Patient is her she she continues to have sharp stabbing pains in her bilateral legs and still having pains in her arm and her neck.  Right now she just feels very sleepy, thinks is from the pain medication she was given.  Denies any fevers, chills, cough, abdominal pain, diarrhea, dysuria, rashes or new skin changes.  Patient denies ever being diagnosed with any rheumatological issues.    Review of Systems: History obtained from the patient  General ROS: negative  for  - chills, fatigue, fever  ENT ROS: negative for - headaches, hearing change, nasal congestion, nasal discharge, sore throat  Respiratory ROS: negative for - cough, pleuritic pain, shortness of breath  Cardiovascular ROS: negative for - chest pain, dyspnea on exertion, edema  Gastrointestinal ROS: negative for - abdominal pain, constipation, diarrhea, and " nausea/vomiting  Genito-Urinary ROS: positive for difficulty with urinating, negative for dysuria, hematuria  Musculoskeletal ROS: positive for stabbing pain in legs, arms  Neurological ROS: negative for - behavioral changes, confusion, dizziness, numbness/tingling   Dermatological ROS: negative for pruritus, rash    Objective    Physical Examination:   General appearance - sleepy, well appearing, and in no distress and oriented to person, place, and time but patient does keep nodding off while we are speaking  Mental status - sleepy, oriented to person, place, and time, normal mood, behavior, speech, dress, motor activity, and thought processes  HEENT - sclera anicteric, left eye normal, right eye normal, nares normal and patent, no erythema  Respiratory - clear to auscultation, no wheezes, rales or rhonchi, symmetric air entry, no tachypnea, retractions or cyanosis  Cardiac - normal rate, regular rhythm, normal S1, S2, no murmurs, rubs, clicks or gallops, no JVD  Abdomen - soft, nontender, nondistended, no masses or organomegaly no rebound tenderness noted bowel sounds normal, no bladder distension noted  Neurological - sleepy, oriented, speech somewhat slurred, no focal findings or movement disorder noted  Musculoskeletal - no joint tenderness, deformity or swelling, full range of motion without pain  Extremities - peripheral pulses normal, +1 bilateral lower extremity edema, no clubbing or cyanosis  Skin - patient appears flushed, no rashes, no suspicious skin lesions noted    Temp:  [97.6  F (36.4  C)] 97.6  F (36.4  C)  Pulse:  [] 109  Resp:  [18-22] 18  BP: ()/(50-99) 99/63  SpO2:  [89 %-96 %] 94 %    No intake or output data in the 24 hours ending 09/06/22 0717    Results    Recent Results (from the past 24 hour(s))   CBC (+ platelets, no diff)    Collection Time: 09/05/22  5:43 PM   Result Value Ref Range    WBC Count 4.8 4.0 - 11.0 10e3/uL    RBC Count 3.16 (L) 3.80 - 5.20 10e6/uL    Hemoglobin  10.8 (L) 11.7 - 15.7 g/dL    Hematocrit 32.3 (L) 35.0 - 47.0 %     (H) 78 - 100 fL    MCH 34.2 (H) 26.5 - 33.0 pg    MCHC 33.4 31.5 - 36.5 g/dL    RDW 13.1 10.0 - 15.0 %    Platelet Count 86 (L) 150 - 450 10e3/uL   Basic metabolic panel    Collection Time: 09/05/22  5:43 PM   Result Value Ref Range    Sodium 132 (L) 136 - 145 mmol/L    Potassium 3.8 3.5 - 5.0 mmol/L    Chloride 101 98 - 107 mmol/L    Carbon Dioxide (CO2) 18 (L) 22 - 31 mmol/L    Anion Gap 13 5 - 18 mmol/L    Urea Nitrogen 48 (H) 8 - 28 mg/dL    Creatinine 1.78 (H) 0.60 - 1.10 mg/dL    Calcium 8.7 8.5 - 10.5 mg/dL    Glucose 98 70 - 125 mg/dL    GFR Estimate 30 (L) >60 mL/min/1.73m2   CRP inflammation    Collection Time: 09/05/22  5:43 PM   Result Value Ref Range    CRP 52.1 (H) 0.0 - <0.8 mg/dL   Erythrocyte sedimentation rate auto    Collection Time: 09/05/22  5:43 PM   Result Value Ref Range    Erythrocyte Sedimentation Rate 110 (H) 0 - 20 mm/hr   Troponin I (now)    Collection Time: 09/05/22  5:43 PM   Result Value Ref Range    Troponin I 0.04 0.00 - 0.29 ng/mL   ECG 12-LEAD WITH MUSE ([facility])    Collection Time: 09/05/22  6:03 PM   Result Value Ref Range    Systolic Blood Pressure  mmHg    Diastolic Blood Pressure  mmHg    Ventricular Rate 103 BPM    Atrial Rate 103 BPM    CA Interval 152 ms    QRS Duration 102 ms     ms    QTc 453 ms    P Axis 32 degrees    R AXIS 6 degrees    T Axis 33 degrees    Interpretation ECG       Sinus tachycardia with occasional Premature ventricular complexes  Otherwise normal ECG  No previous ECGs available  Confirmed by SEE ED PROVIDER NOTE FOR, ECG INTERPRETATION (4000),  GAMA BAEZ (2009) on 9/6/2022 7:37:51 AM     CK total    Collection Time: 09/05/22  6:21 PM   Result Value Ref Range     (H) 30 - 190 U/L   Symptomatic; Yes; 9/1/2022 Influenza A/B & SARS-CoV2 (COVID-19) Virus PCR Multiplex Nasopharyngeal    Collection Time: 09/05/22  6:22 PM    Specimen: Nasopharyngeal; Swab   Result  Value Ref Range    Influenza A PCR Negative Negative    Influenza B PCR Negative Negative    RSV PCR Negative Negative    SARS CoV2 PCR Negative Negative   Basic metabolic panel    Collection Time: 09/06/22  8:58 AM   Result Value Ref Range    Sodium 131 (L) 136 - 145 mmol/L    Potassium 4.6 3.5 - 5.0 mmol/L    Chloride 101 98 - 107 mmol/L    Carbon Dioxide (CO2) 15 (L) 22 - 31 mmol/L    Anion Gap 15 5 - 18 mmol/L    Urea Nitrogen 62 (H) 8 - 28 mg/dL    Creatinine 2.79 (H) 0.60 - 1.10 mg/dL    Calcium 8.0 (L) 8.5 - 10.5 mg/dL    Glucose 69 (L) 70 - 125 mg/dL    GFR Estimate 17 (L) >60 mL/min/1.73m2   TSH    Collection Time: 09/06/22  8:58 AM   Result Value Ref Range    TSH 1.31 0.30 - 5.00 uIU/mL   CBC with platelets and differential    Collection Time: 09/06/22  8:58 AM   Result Value Ref Range    WBC Count 5.4 4.0 - 11.0 10e3/uL    RBC Count 3.23 (L) 3.80 - 5.20 10e6/uL    Hemoglobin 10.8 (L) 11.7 - 15.7 g/dL    Hematocrit 33.9 (L) 35.0 - 47.0 %     (H) 78 - 100 fL    MCH 33.4 (H) 26.5 - 33.0 pg    MCHC 31.9 31.5 - 36.5 g/dL    RDW 13.6 10.0 - 15.0 %    Platelet Count 71 (L) 150 - 450 10e3/uL   Reticulocyte count    Collection Time: 09/06/22  8:58 AM   Result Value Ref Range    % Reticulocyte 2.1 0.8 - 2.7 %    Absolute Reticulocyte 0.066 0.010 - 0.110 10e6/uL   Manual Differential    Collection Time: 09/06/22  8:58 AM   Result Value Ref Range    % Neutrophils 77 %    % Lymphocytes 14 %    % Monocytes 5 %    % Eosinophils 0 %    % Basophils 0 %    % Metamyelocytes 4 %    NRBCs per 100 WBC 2 (H) <=0 %    Absolute Neutrophils 4.2 1.6 - 8.3 10e3/uL    Absolute Lymphocytes 0.8 0.8 - 5.3 10e3/uL    Absolute Monocytes 0.3 0.0 - 1.3 10e3/uL    Absolute Eosinophils 0.0 0.0 - 0.7 10e3/uL    Absolute Basophils 0.0 0.0 - 0.2 10e3/uL    Absolute Metamyelocytes 0.2 (H) <=0.0 10e3/uL    Absolute NRBCs 0.1 (H) <=0.0 10e3/uL    RBC Morphology Confirmed RBC Indices     Platelet Assessment (A) Automated Count Confirmed.  Platelet morphology is normal.     Automated Count Confirmed. Giant platelets are present.    Toxic Neutrophils Present (A) None Seen   UA reflex to Microscopic and Culture    Collection Time: 09/06/22  2:14 PM    Specimen: Urine, Jackson Catheter   Result Value Ref Range    Color Urine Yellow Colorless, Straw, Light Yellow, Yellow    Appearance Urine Turbid (A) Clear    Glucose Urine Negative Negative mg/dL    Bilirubin Urine Negative Negative    Ketones Urine Negative Negative mg/dL    Specific Gravity Urine 1.030 1.001 - 1.030    Blood Urine Negative Negative    pH Urine 5.0 5.0 - 7.0    Protein Albumin Urine 30  (A) Negative mg/dL    Urobilinogen Urine <2.0 <2.0 mg/dL    Nitrite Urine Negative Negative    Leukocyte Esterase Urine 25 Zev/uL (A) Negative    Mucus Urine Present (A) None Seen /LPF    Amorphous Crystals Urine Few (A) None Seen /HPF    RBC Urine 1 <=2 /HPF    WBC Urine 6 (H) <=5 /HPF    Squamous Epithelials Urine <1 <=1 /HPF    Transitional Epithelials Urine <1 <=1 /HPF    Hyaline Casts Urine 3 (H) <=2 /LPF          Lab Results personally reviewed 9/6  Imaging Results personally reviewed 9/6  Discussed with patient  Reviewed old records     Advanced Care Planning  Discharge Planning discussed with patient  Discussed care with patient for 35 minutes with greater than 50% of time spent in counseling and coordination of care.    Naomi Landry DO  Hospitalist Service  [facility]  Text page via [facility] Paging/Directory     This note was created using dragon dictation, any spelling and grammatical errors are unintentional.

## 2022-09-06 NOTE — ED NOTES
Pt still reporting high levels of pain after dilaudid at 0755. Rated 7/10 on pain scale. Offered pt PRN tylenol.

## 2022-09-06 NOTE — UTILIZATION REVIEW
Admission Status; Secondary Review Determination   Under the authority of the Utilization Management Committee, the utilization review process indicated a secondary review on Sarina Alfaro. The review outcome is based on review of the medical records, discussions with staff, and applying clinical experience noted on the date of the review.   (x) Inpatient Status Appropriate - This patient's medical care is consistent with medical management for inpatient care and reasonable inpatient medical practice.     RATIONALE FOR DETERMINATION   72 year old female with past medical history of hypertension, dyslipidemia, and CVA who presented to ER with acute neck and shoulder pain that progressively worsened and included her leg muscles as well.dmitted on 9/5/2022 for myositis, CORNELIUS and hyponatremia , pending nephrology and neurology consult , still low Na, creatinine is increasing , on IV fluid.    At the time of admission with the information available to the attending physician more than 2 nights Hospital complex care was anticipated, based on patient risk of adverse outcome if treated as outpatient and complex care required. Inpatient admission is appropriate based on the Medicare guidelines.   The information on this document is developed by the utilization review team in order for the business office to ensure compliance. This only denotes the appropriateness of proper admission status and does not reflect the quality of care rendered.   The definitions of Inpatient Status and Observation Status used in making the determination above are those provided in the CMS Coverage Manual, Chapter 1 and Chapter 6, section 70.4.   Sincerely,   Tahir Perez MD  Utilization Review  Physician Advisor  [facility]

## 2022-09-06 NOTE — ED NOTES
"[facility] ED Handoff Report    ED Chief Complaint: Neck and Body Pain    ED Diagnosis:  (M60.9) Myositis of multiple sites, unspecified myositis type    (N17.9) Acute kidney injury (H)         PMH:    Past Medical History:   Diagnosis Date     CVA (cerebral vascular accident) (H)      Dyslipidemia         Code Status:  Full Code     Falls Risk: Yes Band: Applied    Current Living Situation/Residence: lives with a significant other and lives in a house     Elimination Status: Continent: indwelling catheter     Activity Level: Total assist/lift    Patients Preferred Language:  English     Needed: No    Vital Signs:  /57   Pulse 116   Temp 98.7  F (37.1  C) (Oral)   Resp 18   Ht 1.651 m (5' 5\")   Wt 77.1 kg (170 lb)   SpO2 95%   BMI 28.29 kg/m         Pain Score: 8/10 PRN Tylenol at 1720    Is the Patient Confused:  Yes    Last Food or Drink: 09/06/22 at Lunch    Focused Assessment:  From home, lives with spouse. Pt is now sitting upright in bed, more alert than she was at shift change (1500). Continues on supplemental oxygen at 2 LPM via NC for low O2 earlier today per handoff report after pt received dilaudid for pain control. Pt was drowsy at shift change and sleeps with eyes open. Difficulty with lower extremity movement, pt stated \"I need help moving my leg\". Bilateral lower extremity drift noted. Bilateral hand  weak. Pt is alert to situation, place, and self. Decreased appetite per handoff report pt only consumed ice cream at lunch. Gave patient menu to order food for diner. Pt is c/o increase in pain 8/10 \"all over\" PRN Tylenol given at (1720). Pt is a full assist with bed mobility. Spouse at bedside. Neurology will see patient today on the unit.    Drowsy with intermittent confusion (time and situation)  On 2 LPM via NC satting at 95%  Jackson patent strict (I/O)  Sodium Bicarb 100 mEq in D5W at 100 ml/hr  Needs assist x2 with ambulation from bed to bedside commode or " chair.   Generalized swelling noted in upper and lower extremities. Tender to touch.   Ultrasound guided IV start Left arm 18G. Pt has hx of breast cancer with mastectomy.         Tests Performed: Done: Labs and Imaging    Treatments Provided:  Pain management and fluids    Family Dynamics/Concerns: No    Family Updated On Visitor Policy: Yes    Plan of Care Communicated to Family: Yes    Who Was Updated about Plan of Care: spouse at bedside    Belongings Checklist Done and Signed by Patient: Yes    Medications sent with patient: Inhaler sent with patient    Covid: asymptomatic , negative    Additional Information: Pt needs assistance with ordering food. Neuro will see patient today.     RN: Suzanne Lei RN 9/6/2022 5:20 PM

## 2022-09-06 NOTE — PROGRESS NOTES
Patient now with hypoxia.  CXR from earlier with some atelectasis but otherwise clear.  Lung exam when I saw her without wheezes, crackles, rhonchi.  Patient was drowsy and this could have been from opiates given.  She is also tachycardic, which is likely from hypovolemia but unclear.   -D Dimer pending but will be elevated given acute inflammation  -Check V/Q scan to rule out PE, can not use contrast right now given CORNELIUS  -Supplemental oxygen  -Hold any further opiates

## 2022-09-06 NOTE — CONSULTS
This is a neurological consultation    72-year-old admitted to hospital on 9/5/2022    Chief complaint  Diffuse pain and weakness question myositis elevated sed rate and CRP  CORNELIUS        HPI  72-year-old presented to the hospital on 9/5/2022 with difficulty with neck pain bilateral arm and leg pain.  Symptoms started 4 days prior.  Patient's been unable to sleep or eat last few days due to the pain    She has been some pain in the left side of the neck pain fluctuates though spreads into the limbs into her arms which seem to be weak and also down her legs.  It is hard for her to walk both because of pain and weakness.  She has trouble getting up from a chair.    Does have a little bit of trouble breathing but she feels that is from her asthma.    Has been on simvastatin    Some trouble with voice and speech  Did get some Dilaudid during hospital visit          Past medical history  Hypertension  Hyperlipidemia  CVA (question 2012), previous left-sided weakness improved  Diaphragmatic hernia  Cardiac pacemaker  Breast cancer right-sided mastectomy  Gouty arthritis        Habits  Past smoker quit 1996  6 beers per day plus    Family history  Noncontributory      Work-up  CT scan head 9/6/2022  A.  No acute changes no hemorrhage no hydrocephalus  CERVICAL SPINE MRI: 9/5/2022 with and without contrast  1.  Mild multilevel spondylosis with reactive inflammatory degenerative changes at the left C3-C4 facet. Associated surrounding soft tissue edema and left scalene muscular strain.  2.  No high-grade canal stenosis.  3.  Moderate left C4-5 foraminal stenosis.  THORACIC SPINE MRI: 9/5/2022 with and without contrast  1.  Unremarkable thoracic spine MRI for age    CRP 52.1/  TSH 1.31  B12 greater than 4000,Past labs, B12 361, (5/28/2021)  Influenza A and influenza B, negative  RSV, negative  COVID-19 negative  Ammonia level 43  D-dimer 6.23 (9/6/2022    Pending  INO  Cortisol  Complement  "C/C3  Aldolase  SSA/SSB  Polymyositis/dermatomyositis panel  Mitochondrial M2 antibody  Anti-Natali 1  Myasthenia gravis panel              Laboratory data  Sodium 131 potassium 4.6  BUN 62, creatinine 2.79  Glucose 69  White blood count 5.4, hemoglobin 10.8, platelets 71,000        Exam  Blood pressure 109/52, pulse 112, temperature 98.2  Blood pressure range 99//99  Pulse range 97-1 12  T-max 98.7    Review of system pertinent positives and negatives  Diffuse achy pain  Diffuse weakness/fatigue  Some blurry vision versus double vision  Developing some soft speech/dysarthria  Question whether she has also some swallowing difficulty or dysphagia  Unable to ambulate due to weakness  No specific headache    Otherwise review systems negative    General exam per primary MD  Seems somewhat flush and warm but temperature is okay repeated during exam  HEENT apraxic movements of the mouth  Lungs clear  Heart rate regular  Abdomen mildly distended tympanic but soft  Thick ankles  Pulses symmetrical in the arms    Neurologic exam  Alerts with loud voice difficulty answering a lot of questions  No specific aphasia but seems a little bit short of breath  Naming normal  Comprehension normal  Repetition normal  No specific aphasia  No neglect  Due to the above difficulty do finer memory testing    Cranials 2 through 12 significant for  No nystagmus  Visual fields intact  Poor tracking with extraocular movements question ophthalmoplegia  Face with apraxic movements of the mouth  Hypophonic speech  Weak eye closure    Upper extremities painful with testing difficulty grade  Proximal arms 2-3 out of 5  Distal arms 2-3 out of 5    Lower extremities painful with testing difficulty grade  Proximal 1-2 out of 5  Distal 2 out of 5 minimal movement    Reflexes reported right over left  Biceps 1/1  Triceps 0/0  Knee 0/0  Ankle 0/0  Toes downgoing  No Zuniga reflex      Assessment/plan    1.  Diffuse neck and back pain with \"achy " "weakness\", coming on over several days       Significantly elevated CRP and sed rate (CRP 52.1/)       CPK is only mildly elevated at 241       Patient seems somewhat warm but does not have a fever      2.  Some shortness of breath worsened by narcotic for pain control    3.  CORNELIUS       Patient being evaluated by nephrology    Question whether this is more a viral syndrome with diffuse myalgias/weakness versus polyradiculoneuropathy (GBS).  Patient with decreased reflexes  Apraxic weak voice and weak eye closure    No significant changes on MRI of the cervical or thoracic spine to suggest discitis or compressive lesion.    Patient needs close monitoring of NIF and FVC    Primary also checking VQ scan to rule out PE, patient has elevated D-dimer    Patient with CORNELIUS being seen by nephrology    Pending  INO  Cortisol  Complement C/C3  Aldolase  SSA/SSB  Polymyositis/dermatomyositis panel  Mitochondrial M2 antibody  Anti-Natali 1  Myasthenia gravis panel      Patient currently with elevated D-dimer.he nuclear medicine scan of the lungs to rule out PE  Also getting echo    Patient on Plavix currently  Might need blood thinner for the above difficulty with question PE    Hold off on LP for now  Depending on course may need to consider lumbar puncture    Differential diagnosis  Polymyositis/dermatomyositis  Possible developing GBS    Should follow respiratory function closely though with difficulty with cranial nerve changes above although patient was moving air well and could actually be interviewed when I saw her.    70 minutes total care time today greater than 50% face-to-face patient care team discussing and evaluating the above.    "

## 2022-09-06 NOTE — CONSULTS
[facility]   [facility] Consult Note     Date of Admission:  9/5/2022  Consult Requested by:Dr Landry  Reason for Consult: CORNELIUS    Assessment & Plan   Sarina Alfaro is a 72 year old female  admitted on 9/5/2022. She presents with 5 days of neck and back muscle pain and weakness.   ?if speech, voice now changed and mild hypoxia, ?due to progression of illness or due to reaction to narcotic overnoc, last before 8 am today.   Case d/w rheum at [facility] and w/u for inflammatory myositis, vasculitis in progress.   Neuro consult pending. ?if this is a primary inflammatory myositis or polyneuropathy or related to viral synd or ??  Narcotics held. Watch resp status closely      CORNELIUS with rising creatinine.   Her baseline creatinine is variable 0.8-1.0 at best in last 2 yrs so maybe has mild baseline low eGFR. Renal ultrasound is negative. Has cleaning.   UA with 6 wbc and 1 rbc. While I cannot rule out a renal inflammatory process, at this point I think a hemodynamic ATN picture seems more likely at this point, probably dry, took ACEi up to admit, and bp lowish. Getting IVF and seems like urine output starting.   She has mild anemia(not new-high MCV) and low plts, suspect related to acute illness and think MAHA less likely but check smear and LDH hapto.     BP is low normal range, normally on lisinopril  Follow with hydration off meds    Hypoxia mild, temporally related to narcotic and inc sleepiness. CXR reviewed. No CHF    Heavy ETOH reported. No known liver disease, chk ammonia.     Anemia macrocytic, ?ETOH related. Ck B12.   Low plts new suspect related to acute illness.     Will follow with IV hydration and f/u renal func in am.   No need for consideration of dialysis currently   Await Neuro/ ?rheum eval and serologies, ?role for pheresis depending on work up.  Will follow.             The patient's care was discussed with Dr Landry.     Edith Triplett MD  [facility]  "Consultants  [phone]    ______________________________________________________________________    Chief Complaint   Presented to ER with progressive muscle pain and weakness since Thursday.     History is obtained from the patient, patient's spouse and chart and limited info from pt who's voice is soft and speak is somewhat garbled.     History of Present Illness   Sarina Alfaro is a 72 year old female who reports was fine until Thursday when she work c/o neck pain and thought she \"slept wrong\"   She had progressive neck and back pain and weakness over next couple days and came to ER yesterday.  Spouse says voice got weak and speech hard to understand since yesterday.   I saw her just after renal ultrasound  Last sedating med was dilaudid at 7:45 am  She appears to fall asleep during questioning. sats were low (reportedly started after dilaudid) and now maintaining 96% on 2 liters.   She tells me urine was pink once yesterday but no dysuria and no other urinary c/o of changes.   At home since onset of sx, she's been in bed or sitting in kitchen and drinking and eating little.   She takes ibuprofen and tylenol \"round the clock always\" per her spouse, ?more or not since onset of sx.   She mostly stays at home, rarely leaves house, does not drive, relies on spouse to do most errands and if she does go out it's with him. He reports it's been weeks since she left the house and does not go into back yard etc.   She does \"drink a lot of beer\" estimates at least 6+beers per day.     Urine output low, cleaning placed and there is clear dark yellow urine in tubing now.         Review of Systems   The 14 point Review of Systems is negative other than noted in the HPI or here and confirmed with spouse    Past Medical History    I have reviewed this patient's medical history and updated it with pertinent information if needed.   Past Medical History:   Diagnosis Date     CVA (cerebral vascular accident) (H)      Dyslipidemia " "       Past Surgical History   I have reviewed this patient's surgical history and updated it with pertinent information if needed.  Past Surgical History:   Procedure Laterality Date     A-V CARDIAC PACEMAKER INSERTION         Social History   I have reviewed this patient's social history and updated it with pertinent information if needed. Sarina Alfaro  reports that she quit smoking about 26 years ago. She has never used smokeless tobacco.  Retired x3 yrs, had one child whom she placed up for adoption.  spouse here and he is having surgical biopsy tomorrow of lesion in his mouth.   +ETOH as above.     Family History   I have reviewed this patient's family history and updated it with pertinent information if needed.   No family history on file.   No FH of CKD known per pt.    Medications    Takes lisinopril and simvastatin and spouse thinks she took on Saturday along with \"lots of ibuprofen and tylenol.   (Not in a hospital admission)      Allergies   No Known Allergies    Physical Exam   Vital Signs: Temp: 97.8  F (36.6  C) Temp src: Oral BP: 115/56 Pulse: 114   Resp: 18 SpO2: 95 % O2 Device: Nasal cannula Oxygen Delivery: 2 LPM  Weight: 170 lbs 0 oz  Appears weak, falls asleep during interview.   Speech is garbled, voice is soft.   HEENT face flushed and mouth dry with some debris on tongue, I don't see any mucositis  Neck supple no jvd or LA  Lungs clear but shallow insp and RR ~10  Cor RRR no extra sounds.   abd soft nt no HSM no masses  Ext feet sl cool but perfused   No pitting   Skin flushed face and chest but no other skin change or rash  Neuro diffusely weak, can't lift up lower ext antigravity.   Can minimally raise wrists and wiggle toes.   Not particularly tender on muscle palpation      Data    Recent Results (from the past 24 hour(s))   CBC (+ platelets, no diff)    Collection Time: 09/05/22  5:43 PM   Result Value Ref Range    WBC Count 4.8 4.0 - 11.0 10e3/uL    RBC Count 3.16 (L) 3.80 - " 5.20 10e6/uL    Hemoglobin 10.8 (L) 11.7 - 15.7 g/dL    Hematocrit 32.3 (L) 35.0 - 47.0 %     (H) 78 - 100 fL    MCH 34.2 (H) 26.5 - 33.0 pg    MCHC 33.4 31.5 - 36.5 g/dL    RDW 13.1 10.0 - 15.0 %    Platelet Count 86 (L) 150 - 450 10e3/uL   Basic metabolic panel    Collection Time: 09/05/22  5:43 PM   Result Value Ref Range    Sodium 132 (L) 136 - 145 mmol/L    Potassium 3.8 3.5 - 5.0 mmol/L    Chloride 101 98 - 107 mmol/L    Carbon Dioxide (CO2) 18 (L) 22 - 31 mmol/L    Anion Gap 13 5 - 18 mmol/L    Urea Nitrogen 48 (H) 8 - 28 mg/dL    Creatinine 1.78 (H) 0.60 - 1.10 mg/dL    Calcium 8.7 8.5 - 10.5 mg/dL    Glucose 98 70 - 125 mg/dL    GFR Estimate 30 (L) >60 mL/min/1.73m2   CRP inflammation    Collection Time: 09/05/22  5:43 PM   Result Value Ref Range    CRP 52.1 (H) 0.0 - <0.8 mg/dL   Erythrocyte sedimentation rate auto    Collection Time: 09/05/22  5:43 PM   Result Value Ref Range    Erythrocyte Sedimentation Rate 110 (H) 0 - 20 mm/hr   Troponin I (now)    Collection Time: 09/05/22  5:43 PM   Result Value Ref Range    Troponin I 0.04 0.00 - 0.29 ng/mL   ECG 12-LEAD WITH MUSE (LHE)    Collection Time: 09/05/22  6:03 PM   Result Value Ref Range    Systolic Blood Pressure  mmHg    Diastolic Blood Pressure  mmHg    Ventricular Rate 103 BPM    Atrial Rate 103 BPM    MS Interval 152 ms    QRS Duration 102 ms     ms    QTc 453 ms    P Axis 32 degrees    R AXIS 6 degrees    T Axis 33 degrees    Interpretation ECG       Sinus tachycardia with occasional Premature ventricular complexes  Otherwise normal ECG  No previous ECGs available  Confirmed by SEE ED PROVIDER NOTE FOR, ECG INTERPRETATION ([id number]),  GAMA BAEZ ([id number]) on 9/6/2022 7:37:51 AM     CK total    Collection Time: 09/05/22  6:21 PM   Result Value Ref Range     (H) 30 - 190 U/L   Vitamin B12    Collection Time: 09/05/22  6:21 PM   Result Value Ref Range    Vitamin B12 >4,000 (H) 232 - 1,245 pg/mL   Symptomatic; Yes; 9/1/2022  Influenza A/B & SARS-CoV2 (COVID-19) Virus PCR Multiplex Nasopharyngeal    Collection Time: 09/05/22  6:22 PM    Specimen: Nasopharyngeal; Swab   Result Value Ref Range    Influenza A PCR Negative Negative    Influenza B PCR Negative Negative    RSV PCR Negative Negative    SARS CoV2 PCR Negative Negative   Basic metabolic panel    Collection Time: 09/06/22  8:58 AM   Result Value Ref Range    Sodium 131 (L) 136 - 145 mmol/L    Potassium 4.6 3.5 - 5.0 mmol/L    Chloride 101 98 - 107 mmol/L    Carbon Dioxide (CO2) 15 (L) 22 - 31 mmol/L    Anion Gap 15 5 - 18 mmol/L    Urea Nitrogen 62 (H) 8 - 28 mg/dL    Creatinine 2.79 (H) 0.60 - 1.10 mg/dL    Calcium 8.0 (L) 8.5 - 10.5 mg/dL    Glucose 69 (L) 70 - 125 mg/dL    GFR Estimate 17 (L) >60 mL/min/1.73m2   TSH    Collection Time: 09/06/22  8:58 AM   Result Value Ref Range    TSH 1.31 0.30 - 5.00 uIU/mL   CBC with platelets and differential    Collection Time: 09/06/22  8:58 AM   Result Value Ref Range    WBC Count 5.4 4.0 - 11.0 10e3/uL    RBC Count 3.23 (L) 3.80 - 5.20 10e6/uL    Hemoglobin 10.8 (L) 11.7 - 15.7 g/dL    Hematocrit 33.9 (L) 35.0 - 47.0 %     (H) 78 - 100 fL    MCH 33.4 (H) 26.5 - 33.0 pg    MCHC 31.9 31.5 - 36.5 g/dL    RDW 13.6 10.0 - 15.0 %    Platelet Count 71 (L) 150 - 450 10e3/uL   Reticulocyte count    Collection Time: 09/06/22  8:58 AM   Result Value Ref Range    % Reticulocyte 2.1 0.8 - 2.7 %    Absolute Reticulocyte 0.066 0.010 - 0.110 10e6/uL   Manual Differential    Collection Time: 09/06/22  8:58 AM   Result Value Ref Range    % Neutrophils 77 %    % Lymphocytes 14 %    % Monocytes 5 %    % Eosinophils 0 %    % Basophils 0 %    % Metamyelocytes 4 %    NRBCs per 100 WBC 2 (H) <=0 %    Absolute Neutrophils 4.2 1.6 - 8.3 10e3/uL    Absolute Lymphocytes 0.8 0.8 - 5.3 10e3/uL    Absolute Monocytes 0.3 0.0 - 1.3 10e3/uL    Absolute Eosinophils 0.0 0.0 - 0.7 10e3/uL    Absolute Basophils 0.0 0.0 - 0.2 10e3/uL    Absolute Metamyelocytes 0.2 (H)  <=0.0 10e3/uL    Absolute NRBCs 0.1 (H) <=0.0 10e3/uL    RBC Morphology Confirmed RBC Indices     Platelet Assessment (A) Automated Count Confirmed. Platelet morphology is normal.     Automated Count Confirmed. Giant platelets are present.    Toxic Neutrophils Present (A) None Seen   UA reflex to Microscopic and Culture    Collection Time: 09/06/22  2:14 PM    Specimen: Urine, Jackson Catheter   Result Value Ref Range    Color Urine Yellow Colorless, Straw, Light Yellow, Yellow    Appearance Urine Turbid (A) Clear    Glucose Urine Negative Negative mg/dL    Bilirubin Urine Negative Negative    Ketones Urine Negative Negative mg/dL    Specific Gravity Urine 1.030 1.001 - 1.030    Blood Urine Negative Negative    pH Urine 5.0 5.0 - 7.0    Protein Albumin Urine 30  (A) Negative mg/dL    Urobilinogen Urine <2.0 <2.0 mg/dL    Nitrite Urine Negative Negative    Leukocyte Esterase Urine 25 Ezv/uL (A) Negative    Mucus Urine Present (A) None Seen /LPF    Amorphous Crystals Urine Few (A) None Seen /HPF    RBC Urine 1 <=2 /HPF    WBC Urine 6 (H) <=5 /HPF    Squamous Epithelials Urine <1 <=1 /HPF    Transitional Epithelials Urine <1 <=1 /HPF    Hyaline Casts Urine 3 (H) <=2 /LPF   Sodium random urine    Collection Time: 09/06/22  2:14 PM   Result Value Ref Range    Sodium Urine mmol/L <20 mmol/L

## 2022-09-06 NOTE — PHARMACY-ADMISSION MEDICATION HISTORY
Pharmacy Note - Admission Medication History    Pertinent Provider Information: None     ______________________________________________________________________    Prior To Admission (PTA) med list completed and updated in EMR.       PTA Med List   Medication Sig Last Dose     albuterol (PROAIR HFA/PROVENTIL HFA/VENTOLIN HFA) 108 (90 Base) MCG/ACT inhaler Inhale 2 puffs into the lungs every 4 hours as needed      allopurinol (ZYLOPRIM) 100 MG tablet Take 100 mg by mouth daily 9/5/2022 at Unknown time     clopidogrel (PLAVIX) 75 MG tablet Take 75 mg by mouth daily 9/5/2022 at Unknown time     fluticasone-salmeterol (ADVAIR) 250-50 MCG/DOSE inhaler Inhale 1 puff into the lungs 2 times daily 9/5/2022 at x1     lisinopril (ZESTRIL) 20 MG tablet Take 20 mg by mouth daily 9/5/2022 at Unknown time     simvastatin (ZOCOR) 20 MG tablet Take 20 mg by mouth daily 9/5/2022 at Unknown time       [facility]  Method of interview communication: in-person    Summary of Changes to PTA Med List  New: All   Discontinued: None  Changed: None    Patient was asked about OTC/herbal products specifically.  PTA med list reflects this.    In the past week, patient estimated taking medication this percent of the time:  greater than 90%.    Allergies were reviewed, assessed, and updated with the patient.      Medications available for use during hospital stay: albuterol HFA.     The information provided in this note is only as accurate as the sources available at the time of the update(s).    Thank you for the opportunity to participate in the care of this patient.    Poornima Amin [facility]  9/5/2022 8:36 PM

## 2022-09-07 PROBLEM — E87.20 METABOLIC ACIDOSIS: Status: ACTIVE | Noted: 2022-01-01

## 2022-09-07 PROBLEM — M62.82 NON-TRAUMATIC RHABDOMYOLYSIS: Status: ACTIVE | Noted: 2022-01-01

## 2022-09-07 PROBLEM — R79.89 ELEVATED BRAIN NATRIURETIC PEPTIDE (BNP) LEVEL: Status: ACTIVE | Noted: 2022-01-01

## 2022-09-07 PROBLEM — G61.0 GUILLAIN-BARRE SYNDROME (H): Status: ACTIVE | Noted: 2022-01-01

## 2022-09-07 PROBLEM — F10.20 ALCOHOL USE DISORDER, MODERATE, DEPENDENCE (H): Status: ACTIVE | Noted: 2022-01-01

## 2022-09-07 PROBLEM — K76.89 HEPATIC DYSFUNCTION: Status: ACTIVE | Noted: 2022-01-01

## 2022-09-07 PROBLEM — J96.01 ACUTE RESPIRATORY FAILURE WITH HYPOXIA (H): Status: ACTIVE | Noted: 2022-01-01

## 2022-09-07 NOTE — PLAN OF CARE
"  Problem: Pain Acute  Goal: Acceptable Pain Control and Functional Ability  Outcome: Ongoing, Progressing  Intervention: Prevent or Manage Pain  Recent Flowsheet Documentation  Taken 9/6/2022 1800 by Roopa Billy RN  Medication Review/Management: medications reviewed     Problem: Muscle Strength Impairment  Goal: Improved Muscle Strength  Outcome: Ongoing, Progressing   Goal Outcome Evaluation:    Oriented x 4 but drowsy.  Face/skin flushed and warm.  Patient reported generalized discomfort.  States \"I feel terrible.\"  Speech slow/unclear d/t drowsiness/lethargy.      Bilateral upper and lower extremity weakness.    Unable to raise arm and legs.  Weak hand grasps bilaterally. Unable to lift legs. Generalized weakness.    O2 sats now 94% on 2L.  Heart rate tachy 1-teens to 120. CIWA score 3.   Dangled Patient at bedside with assist of 2.   Hand tremors now evident.  Reported off to oncoming Nurse.                          "

## 2022-09-07 NOTE — PROGRESS NOTES
"Renal seen at start of pheresis. CVC R internal jugular working ok. Initial VSS  Pt is awake, initially saying \" I've got to kill myself \" repeatedly with clearer voice than previously.   Now denies feeling anxious.   Painful with minor repositioning of hands.  CPK now 1200, s/p LP results pending.   ANCA neg, INO 1:80    Mild hypercapnia.   Made 425 ml urine after bumex.   Getting banana bag now 100cc/hr.   I suspect she's relatively dry, will return 110% of fluids on PLEX.   Will write for prn diuretic if low urine output.   Anxiolytic, sedation per ICU and with close monitoring of resp status.     D/w Dr Rangel again and determine best course, ?kidney bx here and transfer to [facility]. Next planned PLEX on Friday Sept 9.  Edith Triplett MD  [facility]  [phone]        "

## 2022-09-07 NOTE — SIGNIFICANT EVENT
Patient appears much further decompensated from 9/6.  She can barely talk and is taking very shallow breaths.  Even when I sat her up all of the way she still was taking tiny breaths, although endorses not short of breath and no wheezes.  Attempted to help her drink some water but she could not even close her moth around the straw.  Highly suspicious for GBS    Discussed with nephrology and neurology  LP ordered  IR for rebeca  Plan to initiate plasmaphoresis  Spoke with Dr Aguirre in the ICU, will transfer for closer monitoring as respiratory status currently very compromised  NIF + incentive spirometry ordered with RT, do not see any ordered.    Attempted to update  but he is currently having a biopsy performed elsewhere.

## 2022-09-07 NOTE — PLAN OF CARE
Problem: Pain Acute  Goal: Acceptable Pain Control and Functional Ability  Outcome: Ongoing, Progressing  Intervention: Prevent or Manage Pain  Recent Flowsheet Documentation  Taken 9/7/2022 0003 by Jocelynn Greene RN  Medication Review/Management: medications reviewed     Problem: Asthma Comorbidity  Goal: Maintenance of Asthma Control  Outcome: Ongoing, Progressing  Intervention: Maintain Asthma Symptom Control  Recent Flowsheet Documentation  Taken 9/7/2022 0003 by Jocelynn Greene RN  Medication Review/Management: medications reviewed     Problem: Muscle Strength Impairment  Goal: Improved Muscle Strength  Outcome: Ongoing, Progressing     Goal Outcome Evaluation:        Chief complaint of neck, arms, back, and leg pain rating 8/10. Gave PRN tylenol and repositioned several times throughout night for comfort.     Pt is lethargic, arouses to voice, and disoriented to time. Speech is incoherent at times and has intermittent word finding difficulty. Mood appears flat. Cranial nerves intact and symmetrical. On 2 L NC.     CIWA score of 8. Has visible tremors, sweaty palms, along with anxiousness and agitation. PRN Ativan given.     Weakness in upper and lower extremities. Unable to make much movement. Placed soft touch call night on pt - provided education and demonstration on call light use. Jackson catheter is intact and patent.

## 2022-09-07 NOTE — PROGRESS NOTES
[facility]   [facility]ated [facility] Note     Date of Admission:  9/5/2022  Consult Requested by:Dr Landry  Reason for Consult: CORNELIUS    Assessment & Plan   Sarina Alfaro is a 72 year old female  admitted on 9/5/2022. She presents with 5 days of neck and back muscle pain and weakness and progressive speech difficulties and inc atelectasis on CXR, no NIF found.   No more sedatives.   Most of the serologies are pending, complements normal.   D/w Dr Rangel, differential including GBS heladio with inc bulbar sx, vs myositis (CPK up to 1200 today) vs viral syndrome.   He has rec empiric PLEX and asked me to arrange.   I explained rec for CVC and PLEX and potential risks and benefits to pt (and spouse on phone) Both Sarina and spouse Felix gave verbal consent to proceed with temp CVC and pheresis and I am arranging for first treatment later today.   Will do 1.5 plasma volume exchange and replace with albumin. Off ACEi >48 hrs, calcium corrects to 9.5 for alb 1.9.  Plts ~50k so ask IR to place temp CVC for use later today and avoid sedation.   Updated spouse via phone, discussed possible need for mechanical ventilation if worse.       CORNELIUS with rising creatinine.   Her baseline creatinine is variable 0.8-1.0 at best in last 2 yrs so maybe has mild baseline low eGFR. Renal ultrasound is negative. Has cleaning.   UA with 6 wbc and 1 rbc. While I cannot rule out a renal inflammatory process, at this point I think a hemodynamic ATN picture seems more likely at this point, probably dry, took ACEi up to admit, takes lots of NSAIDS chronically and bp lowish.  Her CK at admit was trivially elevated and UA with 1 RBC and heme neg so not suggestive of rhabdo like picture.   INO pending but C3C4 both elevated making lupus nephritis picture less likely.  ANCA pending.   She has mild anemia(not new-high MCV) and low plts, suspect related to acute illness and think MAHA less likely but check smear and LDH hapto. LDH  elevated, hapto and smear pending. Hgb drop could be hemodilutional so mainly has worse low plts.   Due to resp status want to run her dry, some crackles, ?atelectasis or CHF, today's cxr looks like more atelectasis.   Will give a dose of IV bumex now.   NPO so will cont low level fluids, plan to keep even on PLEX  She does not need dialysis today but if ongoing CORNELIUS would rec kidney biospy, though not today as would require sedation.  I will hold off on empiric immunosuppression from renal standpoint today.  Hold anticoagulation for possible kidney biospy tomorrow.     Volume status, suspect dry on admit now equivocal and want to keep dry. U/o low. Will slow IVF, pending plex and follow urine and echo planned.     Hyponatremia worse with ivf worse CORNELIUS, follow after bumex. K normal    Presume met acidosis, better on bicarb.    Ca low but corrects to 9.5 for low alb and phos trivially elevated.      BP was lowish earlier now better, tachy. Echo pending.     Hypoxia mild but clinically worse, plan to follow NIF and status in ICU.    Heavy ETOH reported. No known liver disease, mild inc LFT's, ammonia only 40's  She is at risk of ETOH w/d too with baseline heavy ETOH.    Anemia macrocytic, ?ETOH related. Ck B12 high.   Low plts new suspect related to acute illness.      Bulbar weakness, npo    Gout uric acid high.       The patient's care was discussed with Dr Landry and Dr Linda Rangel.     Edith Triplett MD  [facility]  [phone]    ______________________________________________________________________    Chief Complaint   Presented to ER with progressive muscle pain and weakness since Thursday.     History is obtained from the patient, patient's spouse and chart and limited info from pt who's voice is soft and speak is somewhat garbled.     History of Present Illness   Sarina Alfaro is a 72 year old female who reports was fine until Thursday when she work c/o neck pain and thought  "she \"slept wrong\"   She had progressive neck and back pain and weakness over next couple days and came to ER yesterday.    Sp[facility], [facility].   I have seen her multiple times.   She is weaker, barely able to vocalize  Moving head and neck.   Weaker in limbs  Getting same IVF.  C/o pain diffuse, extremities and neck back and with any movement.     Review of Systems   The 14 point Review of Systems is negative other than noted in the HPI or here and confirmed with spouse    Past Medical History    I have reviewed this patient's medical history and updated it with pertinent information if needed.   Past Medical History:   Diagnosis Date     CVA (cerebral vascular accident) (H)      Dyslipidemia        Past Surgical History   I have reviewed this patient's surgical history and updated it with pertinent information if needed.  Past Surgical History:   Procedure Laterality Date     A-V CARDIAC PACEMAKER INSERTION         Social History   I have reviewed this patient's social history and updated it with pertinent information if needed. Sarina Alfaro  reports that she quit smoking about 26 years ago. She has never used smokeless tobacco.  Retired x3 yrs, had one child whom she placed up for adoption.  spouse here and he is having surgical biopsy tomorrow of lesion in his mouth.   +ETOH as above.     Family History   I have reviewed this patient's family history and updated it with pertinent information if needed.   No family history on file.   No FH of CKD known per pt.    Medications    Takes lisinopril and simvastatin and spouse thinks she took on Saturday along with \"lots of ibuprofen and tylenol.   Medications Prior to Admission   Medication Sig Dispense Refill Last Dose     albuterol (PROAIR HFA/PROVENTIL HFA/VENTOLIN HFA) 108 (90 Base) MCG/ACT inhaler Inhale 2 puffs into the lungs every 4 hours as needed        allopurinol (ZYLOPRIM) 100 MG tablet Take 100 mg by mouth daily  "  9/5/2022 at Unknown time     clopidogrel (PLAVIX) 75 MG tablet Take 75 mg by mouth daily   9/5/2022 at Unknown time     fluticasone-salmeterol (ADVAIR) 250-50 MCG/DOSE inhaler Inhale 1 puff into the lungs 2 times daily   9/5/2022 at x1     lisinopril (ZESTRIL) 20 MG tablet Take 20 mg by mouth daily   9/5/2022 at Unknown time     simvastatin (ZOCOR) 20 MG tablet Take 20 mg by mouth daily   9/5/2022 at Unknown time     docusate sodium (COLACE) 100 MG capsule [DOCUSATE SODIUM (COLACE) 100 MG CAPSULE] Take 1 capsule (100 mg total) by mouth every 12 (twelve) hours. (Patient not taking: Reported on 9/5/2022) 60 capsule 0 Not Taking at Unknown time     oxyCODONE-acetaminophen (PERCOCET/ENDOCET) 5-325 mg per tablet [OXYCODONE-ACETAMINOPHEN (PERCOCET/ENDOCET) 5-325 MG PER TABLET] Take 1 tablet by mouth every 6 (six) hours as needed for pain. (Patient not taking: Reported on 9/5/2022) 12 tablet 0 Not Taking at Unknown time       Allergies   No Known Allergies    Physical Exam   Vital Signs: Temp: 98.5  F (36.9  C) Temp src: Oral BP: 130/61 Pulse: 120   Resp: 16 SpO2: 92 % O2 Device: Nasal cannula Oxygen Delivery: 2 LPM  Weight: 170 lbs 0 oz  Awake and vocalizes one word answers to questions with effort, soft, slurred speech.   HEENT face flushed and mouth dry with some debris on tongue, I don't see any mucositis  Neck supple + jvd or LA  Lungs basilar crackles on R reduced air entry.   Cor RR tachy echo pending.   abd soft nt no HSM no masses  Ext feet oerfused  No pitting   Skin flushed face and chest but no other skin change or rash  Neuro weaker, EOM lateral movement and accommodation intact.   Minimal toe movement, minimal raising of hands antigravity. (worse than yesterday)      Data    Recent Results (from the past 24 hour(s))   UA reflex to Microscopic and Culture    Collection Time: 09/06/22  2:14 PM    Specimen: Urine, Jackson Catheter   Result Value Ref Range    Color Urine Yellow Colorless, Straw, Light Yellow,  Yellow    Appearance Urine Turbid (A) Clear    Glucose Urine Negative Negative mg/dL    Bilirubin Urine Negative Negative    Ketones Urine Negative Negative mg/dL    Specific Gravity Urine 1.030 1.001 - 1.030    Blood Urine Negative Negative    pH Urine 5.0 5.0 - 7.0    Protein Albumin Urine 30  (A) Negative mg/dL    Urobilinogen Urine <2.0 <2.0 mg/dL    Nitrite Urine Negative Negative    Leukocyte Esterase Urine 25 Zev/uL (A) Negative    Mucus Urine Present (A) None Seen /LPF    Amorphous Crystals Urine Few (A) None Seen /HPF    RBC Urine 1 <=2 /HPF    WBC Urine 6 (H) <=5 /HPF    Squamous Epithelials Urine <1 <=1 /HPF    Transitional Epithelials Urine <1 <=1 /HPF    Hyaline Casts Urine 3 (H) <=2 /LPF   Sodium random urine    Collection Time: 09/06/22  2:14 PM   Result Value Ref Range    Sodium Urine mmol/L <20 mmol/L   Osmolality urine    Collection Time: 09/06/22  2:14 PM   Result Value Ref Range    Osmolality Urine 292 100 - 1,200 mmol/kg   Protein  random urine    Collection Time: 09/06/22  2:14 PM   Result Value Ref Range    Total Protein Urine mg/dL 26.8 mg/dL    Total Protein UR MG/MG CR 0.28 mg/mg Cr    Creatinine Urine mg/dL 95 mg/dL   D dimer quantitative    Collection Time: 09/06/22  5:14 PM   Result Value Ref Range    D-Dimer Quantitative 6.23 (H) 0.00 - 0.50 ug/mL FEU   Ammonia    Collection Time: 09/06/22  5:14 PM   Result Value Ref Range    Ammonia 43 (H) 11 - 35 umol/L   Cortisol    Collection Time: 09/07/22  6:59 AM   Result Value Ref Range    Cortisol 25.9 ug/dL   Phosphorus    Collection Time: 09/07/22  6:59 AM   Result Value Ref Range    Phosphorus 4.8 (H) 2.5 - 4.5 mg/dL   Basic metabolic panel    Collection Time: 09/07/22  6:59 AM   Result Value Ref Range    Sodium 129 (L) 136 - 145 mmol/L    Potassium 4.1 3.5 - 5.0 mmol/L    Chloride 95 (L) 98 - 107 mmol/L    Carbon Dioxide (CO2) 18 (L) 22 - 31 mmol/L    Anion Gap 16 5 - 18 mmol/L    Urea Nitrogen 75 (H) 8 - 28 mg/dL    Creatinine 3.67 (H)  0.60 - 1.10 mg/dL    Calcium 7.8 (L) 8.5 - 10.5 mg/dL    Glucose 81 70 - 125 mg/dL    GFR Estimate 13 (L) >60 mL/min/1.73m2   CBC with platelets    Collection Time: 09/07/22  6:59 AM   Result Value Ref Range    WBC Count 4.8 4.0 - 11.0 10e3/uL    RBC Count 2.91 (L) 3.80 - 5.20 10e6/uL    Hemoglobin 9.6 (L) 11.7 - 15.7 g/dL    Hematocrit 30.0 (L) 35.0 - 47.0 %     (H) 78 - 100 fL    MCH 33.0 26.5 - 33.0 pg    MCHC 32.0 31.5 - 36.5 g/dL    RDW 13.7 10.0 - 15.0 %    Platelet Count 59 (L) 150 - 450 10e3/uL   Lactate Dehydrogenase    Collection Time: 09/07/22  6:59 AM   Result Value Ref Range    Lactate Dehydrogenase 434 (H) 125 - 220 U/L   Hepatic panel    Collection Time: 09/07/22  6:59 AM   Result Value Ref Range    Bilirubin Total 1.9 (H) 0.0 - 1.0 mg/dL    Bilirubin Direct 1.2 (H) <=0.5 mg/dL    Protein Total 6.1 6.0 - 8.0 g/dL    Albumin 1.9 (L) 3.5 - 5.0 g/dL    Alkaline Phosphatase 205 (H) 45 - 120 U/L     (H) 0 - 40 U/L    ALT 47 (H) 0 - 45 U/L   B-Type Natriuretic Peptide ([facility] Only)    Collection Time: 09/07/22  6:59 AM   Result Value Ref Range     (H) 0 - 123 pg/mL   CK total    Collection Time: 09/07/22  6:59 AM   Result Value Ref Range    CK 1,280 (HH) 30 - 190 U/L   Uric acid    Collection Time: 09/07/22  6:59 AM   Result Value Ref Range    Uric Acid 10.0 (H) 2.0 - 7.5 mg/dL

## 2022-09-07 NOTE — PROCEDURES
Neurointerventional Surgery  Post-procedure note    Procedure: fluoro guided  Lumbar puncture    Radiologist: Kaiser Drummond MD    Fluoro Time: .2 minutes  Number of images: one    EBL: minimal  Complications: none    Preliminary findings: (see dictation for full detail)  LP at L3-4 level yields 15 ml clear CSF    Assess/Plan:   Samples to lab  Bedrest for one hour    Kaiser Drummond MD  Pager: [phone]  Emergency pager: [phone]  Office: [phone]

## 2022-09-07 NOTE — CONSULTS
Care Management Initial Consult    General Information  Assessment completed with: Spouse or significant other, spouse Felix  Type of CM/SW Visit: Initial Assessment    Primary Care Provider verified and updated as needed: Yes   Readmission within the last 30 days: no previous admission in last 30 days         Advance Care Planning: Advance Care Planning Reviewed:  (no HCD)          Communication Assessment  Patient's communication style: spoken language (English or Bilingual)    Hearing Difficulty or Deaf: no   Wear Glasses or Blind: yes    Cognitive  Cognitive/Neuro/Behavioral: arousability, level of consciousness  Level of Consciousness: lethargic  Arousal Level: arouses to voice                Living Environment:   People in home: spouse     Current living Arrangements: house      Able to return to prior arrangements: yes       Family/Social Support:  Care provided by: self  Provides care for: no one  Marital Status:   , Children          Description of Support System: Supportive, Involved         Current Resources:   Patient receiving home care services: No     Community Resources: None  Equipment currently used at home: none  Supplies currently used at home: None    Employment/Financial:  Employment Status:          Financial Concerns:             Lifestyle & Psychosocial Needs:  Social Determinants of Health     Tobacco Use: Not on file   Alcohol Use: Not on file   Financial Resource Strain: Not on file   Food Insecurity: Not on file   Transportation Needs: Not on file   Physical Activity: Not on file   Stress: Not on file   Social Connections: Not on file   Intimate Partner Violence: Not on file   Depression: Not on file   Housing Stability: Not on file       Functional Status:  Prior to admission patient needed assistance:   Dependent ADLs:: Independent  Dependent IADLs:: Transportation                   Additional Information:    Assessment completed with patient's spouse by request from  patient.  Patient lives with spouse in their house. She has been independent with ADLs and IADLs except for driving. She ambulates without devices. [facility] lesion [facility]. He hopes to be back home same day. If unable to reach him, his sister Julia Dhillon [phone] be contacted.     Final discharge plan pending progression and recommendations.      Hortencia Partida RN

## 2022-09-07 NOTE — PROGRESS NOTES
"[facility]  Neurology Consultation - Progress Note    Patient Name:  Sarina Alfaro  Date of Service:  September 7, 2022    Subjective:    Patient down for a V/q. scan this morning.  Seen later and she is quite sleepy.  She continues to complain of diffuse myalgias.  States it is especially in her neck and her arms.  Did not see any negative inspiratory force as documented.    Objective:    Vitals: /61 (BP Location: Left arm)   Pulse 120   Temp 98.5  F (36.9  C) (Oral)   Resp 16   Ht 1.651 m (5' 5\")   Wt 77.1 kg (170 lb)   SpO2 92%   BMI 28.29 kg/m    General: Lying in bed, mildly distressed on oxygen  Head: Atraumatic, normocephalic   Cardiac: Tachycardic, regular.  Respiratory: Appears mildly short of breath.  On oxygen.  GI: Nontender  Neurologic:  Sleepy but awakens, alert, oriented to person, place, and month.  Speech is dysarthric.  Able to tell me quarters in a dollar and follow two-step commands in her hands.  Does seems generally slow to answer questions.  No nystagmus.  Visual fields grossly intact.  Possible ophthalmoplegia as she has very poor tracking of the may be related to her sleepiness.  Hypophonic speech.  Able to overcome jaw opening.  Neck flexion 3 out of 5, neck extension 3 out of 5.  Proximal arms less than antigravity, 2 out of 5, similar in distal upper extremities.  Lower extremities proximally with just trace movement.  Distally in dorsiflexion and plantarflexion she is at least 3 out of 5.  She has a trace biceps reflex but is otherwise absent reflexes throughout.  Toes are downgoing.    Pertinent Investigations:    I have personally reviewed most recent and pertinent labs, tests, and radiological images.     MRI cervical spine and thoracic spine personally reviewed.  Question rate enhancement especially on the left on cervical spine MRI.    Assessment  #Generalized weakness:Query GBS  #Areflexia  #CORNELIUS  #encephalopathy    Ms. Alfaro is " a 72-year-old female with history of vascular risk factors and CVA who was admitted for question of myositis and generalized weakness.  Her neurologic exam today is markedly impaired with basically less than antigravity weakness throughout.  I also have concern for neck flexion and extension weakness as well as bulbar symptoms.  I think this instance GBS absolutely needs to be ruled out.  The prominent pain component and the elevated inflammatory markers to raise the question of another source.  Potentially a viral prodrome, but would not expect the level of weakness worsening.  We will check tickborne diseases.  However, I do think needs lumbar puncture either way.  One other potential etiology would be vasculitis.  This would be more like to explain the extreme pain, and new CORNELIUS.  However, nephrology feels CORNELIUS is more likely related to ATN.   This would also be less likely to explain her prominent bulbar symptoms.  I spoken to medicine who feels she is weaker today, although my exam is similar to neurologist documented exam yesterday.    Recommendations:   -Lumbar puncture with cell count, glucose, protein, gram stain, freeze fluid if cell count high unexpectedly.  -MRI/MRA of the brain  -Lyme, Anaplasma, Ehrlichia testing  -We will follow-up CK and rheumatology labs already ordered  -NIF and forced vital capacity needs to be checked ASAP.  Pending results may need ICU transfer.  -Low threshold to start plasma exchange.  In setting of relatively severe CORNELIUS would prefer to hold off on IVIG.      Thank you for involving Neurology in the care of Sarina Alfaro.      Sridhar Rangel, DO    My total floor time today for this patient was at least 40 minutes, greater than half of which was for counseling and coordination of care

## 2022-09-07 NOTE — PROGRESS NOTES
Assessment & Plan   72-year-old female with breast cancer, previous CVA, HTN, CKD presents with CORNELIUS on progressive muscle pain and weakness concerning for rheumatological etiology    Active Problems:    Benign essential hypertension    Myositis of multiple sites, unspecified myositis type    Myositis    CORNELIUS (acute kidney injury) (H)    CKD (chronic kidney disease) stage 3, GFR 30-59 ml/min (H)    Breast cancer (H)    Asthma    Gout    Hyponatremia    Thrombocytopenia (H)    Macrocytic anemia    Guillain-[place] syndrome (H)    Acute respiratory failure with hypoxia (H)    Metabolic acidosis    Non-traumatic rhabdomyolysis    Hepatic dysfunction    Elevated brain natriuretic peptide (BNP) level    Alcohol use disorder, moderate, dependence (H)    Present on Admission:    Myositis of multiple sites, unspecified myositis type    Myositis      Probable Guillain-Barré syndrome  -Patient presented with neck, shoulder, back and upper extremity pain that has begun to radiate down to her legs with associated weakness.  Initial MRI cervical and thoracic spine 9/5/2022 with reactive inflammatory degenerative changes of the left C3-C4 facet with associated soft tissue edema, mild multilevel spondylosis but otherwise no high-grade spinal canal or foraminal stenosis.  CXR 9/6 with atelectasis and scarring in both lungs but without nodules, consolidations, masses.  ESR and CRP both grossly elevated.  Initial CK2 141, now greater than 1200.  TSH normal.  C3 and C4 elevated so less likely lupus nephritis.  Initially suspected viral etiology versus vasculitis but patient with profound change in movement abilities.  On 9/7 could hardly speak in breathing very shallow, primary concern now is for Guillain-Barré viral etiology.  -Hold home statin  -Anti-Natali (NEG), anti-Ro (NEG), anti-La (NEG), polymyositis/dermatomyositis panel, myasthenia panel, antimitochondrial DNA, F actin, INO   -Neurology checking lymes, anaplasma, ehrlichia  -[facility]  Rheumatology 9/6: Dr Margarito Linder:     -Recommended also aldolase, ANCA, peripheral smear.  Sed rate >100 should consider intravascular infectious diseases like osteomyelitis, vasculitis (especially with CORNELIUS), malignancy like leukemia, large vessel vasculitis (no vision changes or headache in this patient so less likely).    -ROSA/MRA brain pending  -LP pending  -Tunneled catheter placement for initiation of plasmapheresis  -Neurology following, appreciate recommendations    Acute hypoxic respiratory failure  -Chest x-ray 9/6 and 9/7 with atelectasis but no consolidations.  D-dimer elevated to 6 V/Q scan without evidence of pulmonary embolism.  Initially decreased respiratory drive likely secondary to opiates given in the emergency department but has had profound decrease in neurological status and ability to breathe again wearing for Guillain-Barré.  -Respiratory viral panel pending  -Have asked for NIF, incentive spirometry  -Transferred to ICU for closer monitoring, respiratory support.    CORNELIUS on CKD 3, metabolic acidosis  -Baseline creatinine difficult to ascertain as no labs in the past year but likely close to 1.  Concern for possible vasculitis, although less likely pulmonary renal syndrome.  Renal ultrasound 9/6 without abnormalities.  Urinalysis only with small protein and 1 RBC.  Thought to potentially be from high NSAID and lisinopril use use.  -Maintain Jackson catheter  -StrictI's and O's  -Hold home lisinopril  -Avoid other nephrotoxins  -IVF + bicarbinate as well as diuretics per nephrology  -Neprhology consulted, appreciate recommendations    Rhabdomyolysis  -Patient initially with CK of 241, now greater than 1200.  Highly suspect increased immobilization, with likely Guillain-Barré disease causing some further muscle breakdown.  -IVF  -Bumex  -Avoid other nephrotoxins    Acute hepatic dysfunction  -Strongly suspect alcoholic liver disease.  Elevated bilirubin, AST more than ALT, alkaline phosphatase.   Ammonia only very mildly elevated  -Right upper quadrant ultrasound pending  -Avoid hepatotoxins  -Consider lactulose initiation pending mental status, right upper quadrant ultrasound.    Elevated BNP  -BNP elevated greater than 800.  Patient without known heart failure and no evidence of pulmonary vascular edema on chest x-ray.  Patient does have some lower extremity pitting edema.  Unclear if elevated BNP from CORNELIUS versus acute heart failure.  -TTE pending  -Bumex 2 mg IV x1 per nephrology    Acute metabolic encephalopathy  -Patient very sleepy, difficult to have conversation with on 9/6.  Likely multifactorial from opiates, acute illness.  CT head 9/6 without any acute abnormalities.  Ammonia very mildly elevated at 43.  Concern for developing GBS  -Avoid opiates  -Delirium protocol    Alcohol use disorder  -Patient endorses drinking 7 or 8 beers on a daily basis.  Last alcoholic beverage before coming into the hospital.  -CIWA protocol  -Daily thiamine, folic acid  -SW    Previous CVA  -Hold home simvastatin given possible myositis  -Hold heparin, clopidogrel for lumbar puncture, plasmapheresis    Breast cancer  -Previous mastectomy    HTN  -Hold home lisinopril given CORNELIUS  -Hydralazine p.o. as needed    Asthma  -Not in acute exacerbation  -Breo Ellipta daily  -Albuterol as needed    Gout  -Not in acute exacerbation  -Allopurinol being held    Hyponatremia- worsening  -Likely more from CORNELIUS than hemodynamics at this time.  -IVF plus diuretics    Thrombocytopenia  -Appears to be new for patient, likely reactive in the setting of questionable viral illness, GBS.    Macrocytic anemia  -Hemoglobin 9.6 with an MCV of 103.  TSH normal, B12 elevated > 4000  -Folic acid pending     Clinically Significant Risk Factors Present on Admission         # Acute Kidney Injury, unspecified: based on a >150% or 0.3 mg/dL increase in creatinine on admission compared to past 90 day average, will monitor renal function        #  "Overweight: Estimated body mass index is 28.29 kg/m  as calculated from the following:    Height as of this encounter: 1.651 m (5' 5\").    Weight as of this encounter: 77.1 kg (170 lb).        Electrolytes: sodium 131  Fluids: Sodium bicarb + D5 @5 0 ml/hr  Diet: NPO as high risk for intubation  VTE prophylaxis: SCD    COVID19 symptom check  9/7/2022  Fevers/Chills/myalgias: NEGATIVE TO ALL  Sick contacts/COVID19 exposure: NEGATIVE TO ALL  Cough/trouble breathing/SOB/sore throat: NEGATIVE TO ALL  Diarrhea: NEGATIVE       Expected Discharge Date: 09/12/2022,  3:00 PM  Discharge Delays: Lab Result Pending (enter specific test & time in comments)  Oxygen Needs - Arrange Home O2  Procedure Pending (enter procedure & time in comments)  Destination: home         Subjective  Cc: pain    Patient endorses she feels tired but denies any shortness of breath.  She says it is very hard to move and she has pain across her arms and shoulders mostly.  Denies abdominal pain, chest pain, cough or wheezing.  She feels thirsty but says it is gotten more and more difficult to talk as well as open her mouth.    Review of Systems: History obtained from the patient  General ROS: negative  for  - chills, fatigue, fever  ENT ROS: negative for - headaches, hearing change, nasal congestion, nasal discharge, sore throat  Respiratory ROS: negative for - cough, pleuritic pain, shortness of breath  Cardiovascular ROS: negative for - chest pain, dyspnea on exertion, edema  Gastrointestinal ROS: negative for - abdominal pain, constipation, diarrhea, and nausea/vomiting  Genito-Urinary ROS: positive for difficulty with urinating, negative for dysuria, hematuria  Musculoskeletal ROS: positive for stabbing pain in legs, arms  Neurological ROS: negative for - behavioral changes, confusion, dizziness, numbness/tingling   Dermatological ROS: negative for pruritus, rash    Objective    Physical Examination:   General appearance -alert, well appearing, and " in no distress and oriented to person, place, and time but patient does keep nodding off while we are speaking  Mental status -alert, oriented to person place and time, speech is very slurred and difficult to understand, patient could barely move her mouth, arms, normal behavior.  HEENT - sclera anicteric, left and right eyes drooping nares normal and patent, no erythema, mouth dry patient unable to purse her lips  Respiratory -auscultation without wheezes, rhonchi, crackles, no tachypnea, retractions or cyanosis, nasal cannula in place  Cardiac -tachycardic, regular rhythm, no murmur  Abdomen -, nontender, nondistended, normal bowel sounds in all 4 quadrants  Neurological -alert and oriented but very difficult to understand speech, patient can barely open her mouth, she actively attempts to move her arms and can barely move against gravity both upper and lower extremity.  Inability to purse her mouth and appears to be having some difficulty with swallowing.  Musculoskeletal - no joint tenderness, deformity or swelling, full range of motion without pain  Extremities - peripheral pulses normal, +1 bilateral lower extremity edema, no clubbing or cyanosis  Skin - patient appears flushed, no rashes, no suspicious skin lesions noted    Temp:  [98.1  F (36.7  C)-98.7  F (37.1  C)] 98.1  F (36.7  C)  Pulse:  [112-120] 117  Resp:  [16-26] 26  BP: ()/(52-61) 123/58  SpO2:  [92 %-96 %] 94 %    No intake or output data in the 24 hours ending 09/06/22 0717    Results    Recent Results (from the past 24 hour(s))   D dimer quantitative    Collection Time: 09/06/22  5:14 PM   Result Value Ref Range    D-Dimer Quantitative 6.23 (H) 0.00 - 0.50 ug/mL FEU   Ammonia    Collection Time: 09/06/22  5:14 PM   Result Value Ref Range    Ammonia 43 (H) 11 - 35 umol/L   Cortisol    Collection Time: 09/07/22  6:59 AM   Result Value Ref Range    Cortisol 25.9 ug/dL   Phosphorus    Collection Time: 09/07/22  6:59 AM   Result Value Ref  Range    Phosphorus 4.8 (H) 2.5 - 4.5 mg/dL   Basic metabolic panel    Collection Time: 09/07/22  6:59 AM   Result Value Ref Range    Sodium 129 (L) 136 - 145 mmol/L    Potassium 4.1 3.5 - 5.0 mmol/L    Chloride 95 (L) 98 - 107 mmol/L    Carbon Dioxide (CO2) 18 (L) 22 - 31 mmol/L    Anion Gap 16 5 - 18 mmol/L    Urea Nitrogen 75 (H) 8 - 28 mg/dL    Creatinine 3.67 (H) 0.60 - 1.10 mg/dL    Calcium 7.8 (L) 8.5 - 10.5 mg/dL    Glucose 81 70 - 125 mg/dL    GFR Estimate 13 (L) >60 mL/min/1.73m2   CBC with platelets    Collection Time: 09/07/22  6:59 AM   Result Value Ref Range    WBC Count 4.8 4.0 - 11.0 10e3/uL    RBC Count 2.91 (L) 3.80 - 5.20 10e6/uL    Hemoglobin 9.6 (L) 11.7 - 15.7 g/dL    Hematocrit 30.0 (L) 35.0 - 47.0 %     (H) 78 - 100 fL    MCH 33.0 26.5 - 33.0 pg    MCHC 32.0 31.5 - 36.5 g/dL    RDW 13.7 10.0 - 15.0 %    Platelet Count 59 (L) 150 - 450 10e3/uL   Lactate Dehydrogenase    Collection Time: 09/07/22  6:59 AM   Result Value Ref Range    Lactate Dehydrogenase 434 (H) 125 - 220 U/L   Hepatic panel    Collection Time: 09/07/22  6:59 AM   Result Value Ref Range    Bilirubin Total 1.9 (H) 0.0 - 1.0 mg/dL    Bilirubin Direct 1.2 (H) <=0.5 mg/dL    Protein Total 6.1 6.0 - 8.0 g/dL    Albumin 1.9 (L) 3.5 - 5.0 g/dL    Alkaline Phosphatase 205 (H) 45 - 120 U/L     (H) 0 - 40 U/L    ALT 47 (H) 0 - 45 U/L   B-Type Natriuretic Peptide ([facility] Only)    Collection Time: 09/07/22  6:59 AM   Result Value Ref Range     (H) 0 - 123 pg/mL   CK total    Collection Time: 09/07/22  6:59 AM   Result Value Ref Range    CK 1,280 (HH) 30 - 190 U/L   Uric acid    Collection Time: 09/07/22  6:59 AM   Result Value Ref Range    Uric Acid 10.0 (H) 2.0 - 7.5 mg/dL   Extra Green Top (Lithium Heparin) Tube    Collection Time: 09/07/22 11:27 AM   Result Value Ref Range    Hold Specimen JIC       IMPRESSION:     No evidence of pulmonary embolism.     IMPRESSION:      Cardiac silhouette is normal in size.  Vascular pedicle with is not increased and azygos vein is not distended.     There are multiple subsegmental sized angular opacities in the lower lungs consistent with areas of atelectasis. No lung edema or consolidation is present. Diaphragm curvature is preserved. No visible pleural fluid. No pneumothorax.    Lab Results personally reviewed 9/7  Imaging Results personally reviewed 9/7  Discussed with patient  Reviewed old records     Advanced Care Planning  Discharge Planning discussed with patient  Discussed care with patient for 35 minutes with greater than 50% of time spent in counseling and coordination of care.    Attempted to update patient's  Felix but went to voicemail and voicemail was full    Naomi Landry DO  Hospitalist Service  [facility]  Text page via [facility] Paging/Directory     This note was created using dragon dictation, any spelling and grammatical errors are unintentional.

## 2022-09-07 NOTE — IR NOTE
Pt transferred to ICU-[place] [id number] via cart, monitored and on O2. Report given to Sanjuanita ALCAZAR RN.

## 2022-09-07 NOTE — CONSULTS
[facility]:  CRITICAL CARE CONSULT     Date / Time of Admission:  9/5/2022  5:14 PM    ID: Sarina Alfaro is a 72 year old female with a PMH significant for breast cancer, previous CVA, pacemaker, Hypertension, and CKD stage 3 who presented to [facility] on 9/5 with progressive muscle pain and weakness. Admission to the ICU on 9/7 due to increased weakness and shallow breathing concerning for GBS.          Changes for today: 1.   Lumbar puncture.  2. MRI/MRA of the brain -this is currently on hold in her current respiratory status.  3. Lyme, anaplasma, ehrlichia testing.  4. NIF and forced vital capacity.  5. Non-tunneled HD catheter placement   6. Consider limited abdominal ultrasound to look at spleen   7. Peripheral Blood smear         Assessment/Plan:   Neurologic: Generalized weakness of upper and lower extremities, now including facial and bulbar muscles with accompanying pain. Possible etiologies include GBS, viral syndrome, tickborne disease, or vasculitis. Patient has an elevated ESR and CRP. Initial CK2 141 and it is now greater than 1200. MRI cervical and thoracic spine 9/5 show reactive inflammatory degenerative changes of the left C3-C4 facet with associated soft tissue edema, mild multilevel spondylosis but otherwise no high-grade spinal canal or foraminal stenosis.  Since admission patient has had profound change in her movement abilities. Starting 9/7 she could hardly speak and her breathing became shallow. Anti-Natali, anti-Ro, anti-La, polymyositis/dermatomyositis panel, myasthenia panel, antimitochondrial DNA, F actin, INO (all pending). [facility] Rheumatology 9/6: Dr Margarito Linder: Recommended also aldolase, ANCA, peripheral smear.  Sed rate >100 should consider intravascular infectious diseases like osteomyelitis, vasculitis (especially with CORNELIUS), malignancy like leukemia, large vessel vasculitis (no vision changes or headache in this patient so less likely).      I am concerned  there may also be some comorbid alcohol withdrawal syndrome.  Somewhat difficult to assess.  We will monitor for tachycardia and hypertension.  In the absence of these vital sign abnormalities the likelihood of severe withdrawal is relatively low.  For now hold off benzodiazepines and similar.      Neurology consult, appreciate recommendations.     Lumbar puncture with white cell count, glucose, protein, gram stain, freeze fluid if cell count high unexpectedly     MRI/MRA of the brain -currently on hold because of respiratory status    Lyme, Anaplasma, Ehrlichia testing     Follow up on CK and rheumatology labs     NIF and forced vital capacity     Hold off on IVIG for now because of CORNELIUS     Starting plasmapheresis     Pulmonary: Acute Hypoxic Respiratory Failure.  VBG with normal ventilation patient currently on 3 liters at 93%. Chest x-ray with atelectasis but no consolidations. Patient is struggling to talk and is taking shallow breaths, but denies feeling short of breath.  D-dimer was elevated, but V/Q scan showed no evidence for pulmonary embolism. Negative for influenza, RSV, and SARS CoV2. Unable to get accurate FVC and NIF due to patients facial muscle weakness. ABG's are reassuring with a pH of 7.38 and PCO2 of 36 currently. Patient has a history of asthma, currently takes Breo Ellipta daily.     Wean supplemental O2 as tolerated; goal O2 sat > 92%.  HOB > 30 degrees to limit aspiration risk.       Albuterol as needed.      Cardiovascular: Hemodynamically stable. Patient was noted to have an elevated BNP (895). Chest x-ray does not show evidence of pulmonary edema and cardiac silhouette is normal size. Elevation possibly from acute heart failure vs CORNELIUS.    TTE     Bumex x2 mg IV x 1 per nephrology     Cardiac monitoring.  SBP >= 90 mmHg, MAP >= 65 mmHg.  EKG PRN.     GI/: Elevated AST/ALT most likely from alcoholic liver disease. Elevated bilirubin, AST, ALT, and alkaline phosphatase. Patient endorses  "drinking 7 to 8 beers on a daily basis.     Right upper quadrant ultrasound is ordered.     Avoid hepatotoxins    Nutrition: NPO.     GI prophylaxis: None.     Renal: Acute kidney injury, patients baseline creatinine is around 0.8-1.0. Current creatinine is 3.67. Nephrology feels that a hemodynamic ATN is more likely at this point. IV bumex given today.     Nephrology consulting, appreciate recommendations. Considering kidney biopsy tomorrow if ongoing CORNELIUS.     Non-tunneled HD catheter placement today    Jackson in place    Monitor I/O's.  Electrolyte repletion PRN.  Avoid/limit nephrotoxic agents.    IVFs: Sodium Chloride 0.9% and Sodium bicarbonate 100 mEq in D5W 1,000 mL infusion     Heme/Onc: Thrombocytopenia, appears to be new this hospitalization. Peripheral smear to further evaluate thrombocytopenia.  Consider abdominal ultrasound to look at the spleen for possible platelet sequestration.     DVT prophylaxis: Plavix and Heparin on hold. SCD.     Risk Factors Present on Admission:  Clinically Significant Risk Factors Present on Admission               # Overweight: Estimated body mass index is 28.29 kg/m  as calculated from the following:    Height as of this encounter: 1.651 m (5' 5\").    Weight as of this encounter: 77.1 kg (170 lb).          Lines/Drains/Tubes:  CVC Double lumen Right Internal Jugular Non-tunneled placed 9/7/2022  16 Frisian Urethral catheter placed 9/6/2022     Code Status:  Full Code     The patient and/or the family was educated about the above plan of care and indicated understanding.       This patient is considered critically ill and requires ICU level of care due to increasing weakness and shallow breathing concerning for GBS.     This patient is critically ill at high risk of decompensation and death.  45 minutes critical care time spent thus far today.            ICU DAILY CHECKLIST:   ICU DAILY CHECKLIST           Can patient transfer out of MICU? no    FAST HUG:    Feeding:  no.  "   Jackson: yes  Analgesia/Sedation: no;   Thromboembolic prophylaxis: yes; Mode:  SCDs  HOB>30:  yes  Stress Ulcer Protocol Active: no; Mode: Not Indicated  Glycemic Control: Any glucose > 180 no; Mode of Insulin Therapy: Not indicated    INTUBATED:  Can patient have daily waking:  n/a  Can patient have spontaneous breathing trial:  n/a    Restraints? no    PHYSICAL THERAPY AND MOBILITY:  Can patient have PT and mobility trial: no  Activity: Bedrest     Chief Complaint Chief Complaint   Patient presents with     Neck Pain     Arm Pain     Leg Pain             HPI:   Sarina Alfaro is a 72 year old female with a PMH significant for breast cancer, previous CVA, pacemaker, Hypertension, and CKD stage 3 who presented to [facility] on 9/5 with acute neck and shoulder pain that started on Friday 9/2 which progressively worsened and then included her leg muscles as well. She was unable to do regular things without pain such as getting up from a chair, going up and down stairs, and using her shoulders. Denies any fevers, chills, cough, abdominal pain, diarrhea, dysuria, rashes, or other skin changes.     Admission to the ICU on 9/7 due to increased weakness and shallow breathing concerning for GBS.     History is provided by patient and chart review.        Review of Systems:   Review of Systems:  Review of systems is limited by patient factors - weakening facial muscles resulting in difficulty speaking.         Medical/Surgical History:     Past Medical History:   Diagnosis Date     CVA (cerebral vascular accident) (H)      Dyslipidemia       Past Surgical History:   Procedure Laterality Date     A-V CARDIAC PACEMAKER INSERTION              Allergies/Medications:   Allergies:   No Known Allergies    OUTPATIENT Medications:  Medications Prior to Admission   Medication Sig Dispense Refill Last Dose     albuterol (PROAIR HFA/PROVENTIL HFA/VENTOLIN HFA) 108 (90 Base) MCG/ACT inhaler Inhale 2 puffs into the lungs every 4 hours  as needed        allopurinol (ZYLOPRIM) 100 MG tablet Take 100 mg by mouth daily   9/5/2022 at Unknown time     clopidogrel (PLAVIX) 75 MG tablet Take 75 mg by mouth daily   9/5/2022 at Unknown time     fluticasone-salmeterol (ADVAIR) 250-50 MCG/DOSE inhaler Inhale 1 puff into the lungs 2 times daily   9/5/2022 at x1     lisinopril (ZESTRIL) 20 MG tablet Take 20 mg by mouth daily   9/5/2022 at Unknown time     simvastatin (ZOCOR) 20 MG tablet Take 20 mg by mouth daily   9/5/2022 at Unknown time     docusate sodium (COLACE) 100 MG capsule [DOCUSATE SODIUM (COLACE) 100 MG CAPSULE] Take 1 capsule (100 mg total) by mouth every 12 (twelve) hours. (Patient not taking: Reported on 9/5/2022) 60 capsule 0 Not Taking at Unknown time     oxyCODONE-acetaminophen (PERCOCET/ENDOCET) 5-325 mg per tablet [OXYCODONE-ACETAMINOPHEN (PERCOCET/ENDOCET) 5-325 MG PER TABLET] Take 1 tablet by mouth every 6 (six) hours as needed for pain. (Patient not taking: Reported on 9/5/2022) 12 tablet 0 Not Taking at Unknown time        INPATIENT Medications: Continuous Infusions:    sodium bicabonate in 5% dextrose for infusion 50 mL/hr at 09/07/22 1127     INPATIENT Medications: Scheduled Medications:    allopurinol  100 mg Oral Daily     clopidogrel  75 mg Oral Daily     fluticasone-vilanterol  1 puff Inhalation Daily     folic acid  1 mg Oral Daily     heparin ANTICOAGULANT  5,000 Units Subcutaneous Q8H     multivitamin w/minerals  1 tablet Oral Daily     sodium chloride (PF)  3 mL Intracatheter Q8H     thiamine  100 mg Oral Daily         Family History:     Social History:   Reviewed:    Parents: unknown    Siblings: unknown    Children: unknown    No family history on file. Reviewed:    Tobacco: Former smoker, quit 1996    Alcohol: 7 to 8 beers a day    Drugs: None    Other: None  Social History     Socioeconomic History     Marital status:      Spouse name: Not on file     Number of children: Not on file     Years of education: Not on  "file     Highest education level: Not on file   Occupational History     Not on file   Tobacco Use     Smoking status: Former Smoker     Quit date: 1996     Years since quittin.7     Smokeless tobacco: Never Used   Substance and Sexual Activity     Alcohol use: Not on file     Drug use: Not on file     Sexual activity: Not on file   Other Topics Concern     Not on file   Social History Narrative     Not on file     Social Determinants of Health     Financial Resource Strain: Not on file   Food Insecurity: Not on file   Transportation Needs: Not on file   Physical Activity: Not on file   Stress: Not on file   Social Connections: Not on file   Intimate Partner Violence: Not on file   Housing Stability: Not on file              Objective:   Vitals:  /61 (BP Location: Left arm)   Pulse 120   Temp 98.5  F (36.9  C) (Oral)   Resp 16   Ht 1.651 m (5' 5\")   Wt 77.1 kg (170 lb)   SpO2 92%   BMI 28.29 kg/m    Vent: Resp: 16    GEN: Lying in bed, alert, unable to move her facial muscles to adequately respond while taking a history.  HEENT: Normocephalic, atraumatic. Anicteric sclera. Mucous membranes are dry. Patient is unable to close her mouth or purse her lips.   NECK: Supple.   PULM: Non-labored breathing.  No use of accessory muscles.  Clear to ausculation bilaterally. Patient appears to be taking shallow breaths.   CVS: Tachycardic and normal rhythm.  Normal S1, S2.  No rubs, murmurs, or gallops.    ABDOMEN: Normoactive bowel sounds.  Non-tender to palpation.  Non-distended.    EXTREMITES:  No clubbing or cyanosis. +1 bilateral lower extremity edema.   NEURO:  Awake and alert. Patient can barely move her upper or lower extremities against gravity. She is unable to move her facial muscles or close her mouth.     Intake/Output: I/O last 3 completed shifts:  In: . [P.O.:60; I.V.:.]  Out: 350 [Urine:350]        Pertinent Studies:   All laboratory data reviewed  Serum Glucose range:   Recent " Labs   Lab 09/07/22  0659 09/06/22  0858 09/05/22  1743   GLC 81 69* 98     ABG: No lab results found in last 7 days.  CBC:   Recent Labs   Lab 09/07/22 0659 09/06/22  0858 09/05/22  1743   WBC 4.8 5.4 4.8   HGB 9.6* 10.8* 10.8*   HCT 30.0* 33.9* 32.3*   * 105* 102*   PLT 59* 71* 86*   NEUTROPHIL  --  77  --    LYMPH  --  14  --    MONOCYTE  --  5  --    EOSINOPHIL  --  0  --      Chemistry:   Recent Labs   Lab 09/07/22  0659 09/06/22  1714 09/06/22  0858 09/05/22  1743   *  --  131* 132*   POTASSIUM 4.1  --  4.6 3.8   CHLORIDE 95*  --  101 101   CO2 18*  --  15* 18*   BUN 75*  --  62* 48*   CR 3.67*  --  2.79* 1.78*   GFRESTIMATED 13*  --  17* 30*   RICHY 7.8*  --  8.0* 8.7   PROTTOTAL 6.1  --   --   --    ALBUMIN 1.9*  --   --   --    *  --   --   --    ALT 47*  --   --   --    ALKPHOS 205*  --   --   --    BILITOTAL 1.9*  --   --   --    MATTHEW  --  43*  --   --      Coags:  No results for input(s): INR, PROTIME, PTT in the last 168 hours.    Invalid input(s): APTT  Cardiac Markers:  Recent Labs   Lab 09/05/22  1743   TROPONINI 0.04        Microbiology:  Influenza A PCR 9/5/2022: Negative   Influenza B PCR 9/5/2022: Negative   RSV PCR 9/5/2022: Negative   SARS CoV2 PCR 9/5/2022: Negative   CSF Cell Count: Pending   Gram Stain CSF: Pending   CSF aerobic bacterial culture: Pending         Cardiology/Radiology:   Cardiac:     EKG, 9/5/2022: Sinus Tachycardia with occasional PVC's, otherwise normal.     TTE: Obtaining today.    Radiology:    Chest X-Ray, 9/7/2022:  Cardiac silhouette is normal in size. Vascular pedicle with is not increased and azygos vein is not distended. There are multiple subsegmental sized angular opacities in the lower lungs consistent with areas of atelectasis. No lung edema or consolidation is present. Diaphragm curvature is preserved. No visible pleural fluid. No pneumothorax.    NM Lung Scan Ventilation and Perfusion, 9/7/2022: No evidence of pulmonary embolism.     CT  Head w/o contrast 9/6/2022: No acute intracranial process.    US Renal Complete, 9/6/2022: No significant abnormality identified.    XR Chest 1 View, 9/6/2022: There is linear atelectasis or scarring in both lung bases. No focal consolidation, pneumothorax or pleural effusions. No discrete nodules are identified. The heart size and pulmonary vascularity are normal.    MR Thoracic Spine, 9/5/2022: Unremarkable thoracic spine MRI for age.    MR Cervical Spine, 9/5/2022: 1.  Mild multilevel spondylosis with reactive inflammatory degenerative changes at the left C3-C4 facet. Associated surrounding soft tissue edema and left scalene muscular strain. 2.  No high-grade canal stenosis. 3.  Moderate left C4-5 foraminal stenosis.        I independently examined and evaluated this patient.  The above note reflects our jointly produced assessment and plan.  I have made my own edits.  The time spent on critical care documented is the time spent by me personally.    Ajay Aguirre MD    9/7/22

## 2022-09-07 NOTE — CONSULTS
"  Interventional Radiology - Pre-Procedure Note:  9/7/2022    Procedure Requested: non-tunneled HD catheter placement  Requested by: Edith Triplett MD    Brief HPI: Sarina Alfaro is a 72 year old female who presented with neck/back muscle pain and weakness. Concern for rheumatological etiology. Nephrology consulted and requesting non-tunneled central line placement for emergent pheresis needs.    NPO: NA - no sedation for procedure  ANTICOAGULANTS: Plavix  ANTIBIOTICS: none indicated for procedure    ALLERGIES  No Known Allergies      LABS:  No results found for: INR   Hemoglobin   Date Value Ref Range Status   09/07/2022 9.6 (L) 11.7 - 15.7 g/dL Final     Platelet Count   Date Value Ref Range Status   09/07/2022 59 (L) 150 - 450 10e3/uL Final     Creatinine   Date Value Ref Range Status   09/07/2022 3.67 (H) 0.60 - 1.10 mg/dL Final     Potassium   Date Value Ref Range Status   09/07/2022 4.1 3.5 - 5.0 mmol/L Final         EXAM:  /61 (BP Location: Left arm)   Pulse 120   Temp 98.5  F (36.9  C) (Oral)   Resp 16   Ht 1.651 m (5' 5\")   Wt 77.1 kg (170 lb)   SpO2 92%   BMI 28.29 kg/m    General:  Stable.  In no acute distress.    Neuro:  Alert  Resp:  Unlabored.  Skin:  Without excoriations, ecchymosis, erythema, lesions or open sores on upper chest and neck.    Code Status: Full Code intra procedure, per chart review.       ASSESSMENT/PLAN:   Percutaneous non-tunneled hemodialysis catheter placement.    Procedure, risks/benefits, details, alternatives, and sedation reviewed with patient/ and patient verbalized understanding. All questions answered. OK to proceed with above radiology procedure.     Catheter placement also previously discussed with patient and spouse by Dr. Triplett.    BEN Gonsales CNP  Interventional Radiology    "

## 2022-09-07 NOTE — PROGRESS NOTES
Care Management Follow Up    Length of Stay (days): 2    Expected Discharge Date: 09/09/2022     Concerns to be Addressed:  discharge planning     Patient plan of care discussed at interdisciplinary rounds: Yes    Anticipated Discharge Disposition:  TCU  Anticipated Discharge Services:  TCU  Anticipated Discharge DME:  Per treatment team    Patient/family educated on Medicare website which has current facility and service quality ratings:    Education Provided on the Discharge Plan:    Patient/Family in Agreement with the Plan:      Referrals Placed by CM/VENKATESH:    Private pay costs discussed: Not applicable    Additional Information:  VENKATESH RITTER attempted to meet with Pt at bedside to discuss thearpy rec of TCU, but Pt appeared to lethargic and did not respond to SW attempting to say hello.      SW left room and spoke with bedside RN and HUC.  SW informed that Pt is transferring to  due to clinical change.      LISA MurphySW

## 2022-09-08 NOTE — PROGRESS NOTES
SPIRITUAL HEALTH SERVICES Progress Note      Saw pt Sarina Alfaro per staff referral.    Illness Narrative -  Felix shared about Sarina's medical condition and the events of the night/morning. Sarina  during the visit.  Felix was tearful during the visit as he was saying goodbye to Sarina.     Coping - Felix shared life history; they have been  for 45 years. He had talked to some family about Sarina's condition, but he plans to go home and let them know that Sarina has . They both come from [place] background. He was comforted by his belief in heaven and that one day he will see Sarina again. Offered reading of Psalm 23 and prayer of blessing. Felix expressed appreciation for the visit and prayer.     Phillip Pak MDiv, [facility]  Lead Staff , [facility]  [phone]

## 2022-09-08 NOTE — PROGRESS NOTES
"Apheresis Plasma Exchange Treatment      Diagnosis: Possible GBS     Apheresis treatment performed following orders by Dr. Triplett     Consent verified Yes. Dated 09/07/22.  Report received from Sanjuanita Conteh RN.       Treatment number - 1  Machine used - Optia 9  Height - 5'5\"  Weight - 175 # (BMI 25- Optia programmed 150 #)  Hct - 30.0%  Treatment volume - 1.5 PV~ 4250 ml's  Replacement product-5% albumin  Electrolyte replacement-5 grams CaGl in 100 ml NS infused over course of tx.   Premeds-n/a  Fluid Balance - 110%  Inlet speed - 50 ml/ min until interface established then increased to 60 ml/ min for remainder of run.   Inlet AC Ratio - 1:14 due to hepatic and renal impairment.   Access - Non tunneled RIJ placed prior to tx today by Rashad Grubbs MD. Placement confirmation stated in radiology documentation. Both limbs aspirated and flushed well. Dressing remains c/d/i with Biopatch under transparent dressing.      Post treatment line dwell-1:1000 unit heparin instilled into each limb to appropriate amount. Limbs clamped, ends capped, wrapped and labeled.      Tx notes - Treatment performed at patient bedside in ICU. She was not cognitively appropriate to receive any education. Lungs clear, HR baseline tachycardia and regular, moderate generalized edema. Due to pre tx axillary temperature of 99.5, blood warmer turned off for run. Patient tolerated tx well. Dr. Triplett saw patient at beginning of tx when she was calling out that she needed to kill herself. She would call out on and off during run that she wanted to die. B/P stable throughout run. See Epic apheresis flow sheet for further details.       Complications- none     Report given to Sanjuanita Conteh RN.      Next treatment 1.0 PV, Friday 09/09/22 at approximately 1300  "

## 2022-09-08 NOTE — SIGNIFICANT EVENT
Dr. Aguirre ordered autopsy to be done at [facility]. Pathology contacted and faxed required documents. Security at both [place] and [facility] notified. [facility]  home notified to  body (they have contract with [place]). [facility] was notified that pt was transferred to [facility]; they will  remains for creation after testing done. Soila CALDWELL, notified. Registration notified.

## 2022-09-08 NOTE — PLAN OF CARE
"Goal Outcome Evaluation:    Plan of Care Reviewed With: patient, spouse     Overall Patient Progress: [facility] - ICU    RN Progress Note:            Pertinent Assessments:      Please refer to flowsheet rows for full assessment     Pt has worsening muscle weakness. Could lift her arms off of bed at 1430 and now is unable to lift arms but does have a very weak  bilaterally. Pt can only slightly wiggle toes. Pt now has NO blink reflex.          Mobility Level:     2         Key Events - This Shift:     Pt was transferred from  for worsening weakness. Before arriving to ICU pt had a LP and dialysis cath placed in RIJ for apheresis. Pt has been tachycardic in the teens. CIWA scores have been 5 or less but pt now is stating \"I want to die\", she said this mulitple times and MD was notified.               Plan:     Apheresis completed. Pt still need MRI head and neck but concerned about her being able to communicate while in MRI and airway as muscle weakness is getting worse. Per Dr. Triplett (renal) pt may go to [facility] tomorrow for further workup is muscle or vascular biopsy is required.          Point of Contact Update YES-OR-NO: Yes  If No, reason:   Name:Felix  Phone Number:  Summary of Conversation: Felix wanted pt and us to know he will not be able to visit tomorrow until after noon. He is going [facility] for pre-surgical work-up tomorrow am.           "

## 2022-09-08 NOTE — PROGRESS NOTES
Death note    Sarina had prolonged cardiac arrest and after discussion with her  we transitioned her to comfort care.    Called to the patient's bedside for lack of respiratory or cardiac activity.    On inspection, palpation and auscultation there is no evidence of spontaneous cardiac or respiratory activity.  No cranial nerve V reflex.    Sarina's  was at the bedside.  I shared my condolences.    Time of death [id number] 9/8/22    Rest in peace, Sarina Aguirre MD

## 2022-09-08 NOTE — PROGRESS NOTES
"[facility]  Neurology Consultation - Progress Note    Patient Name:  Sarina Alfaro  Date of Service:  2022    Subjective:    Patient with rapid response called overnight.  Initially noted to be unresponsive and so was intubated.  While on telemetry he was noted to convert into torsades rhythm and had an hour of CPR with multiple rounds of defibrillation.  LP was done yesterday without albumin no cytologic dissociation.  Normal cells as well.    Objective:    Vitals: /56   Pulse (!) 144   Temp (!) 100.6  F (38.1  C)   Resp 26   Ht 1.651 m (5' 5\")   Wt 77.1 kg (170 lb)   SpO2 91%   BMI 28.29 kg/m    General: Lying in bed, NAD, ventilated, tachycardic, febrile.    Pertinent Investigations:    I have personally reviewed most recent and pertinent labs, tests, and radiological images.     Assessment/Plan:  #Generalized weakness  #elevated CK    72 year old female who neurology was consulted for generalized weakness with concern for myalgias.  Concern was for GBS but other etiologies including viral, or vasculitis were considered as she had significant pain and elevated inflammatory markers as well as new CORNELIUS.  First plasma exchange was done last night.  unfortunately had acute decompensation overnight and CODE BLUE called for torsades rhythm.  Plan was to transfer to [place] for consideration of ECMO, however this morning spouse has chosen to withdraw care.  I am told patient  shortly after ventilator was turned off.        Thank you for involving Neurology in the care of Sarina Alfaro.    Sridhar Rangel, DO  Neurology      "

## 2022-09-08 NOTE — PLAN OF CARE
Physical Therapy Discharge Summary    Reason for therapy discharge:    Pt did pass away    Progress towards therapy goal(s). See goals on Care Plan in [facility] electronic health record for goal details.  Pt did pass away

## 2022-09-08 NOTE — PROGRESS NOTES
Pt had code blue approx 0548 and compressions started. Increased PEEP from 5 to 10 with sats of 23%. Gave report to day shift RT at bedside and they took over assisting in code.

## 2022-09-08 NOTE — SIGNIFICANT EVENT
0529- Code blue called again due to Torsades de Pointes rhythm that converted into V fib. MD at bedside. Defibrillated multiple times. Epi, bicarb, lidocaine, magnesium, calcium, and amiodarone given without ROSC. Coded for about 1.5-2 hrs per MD. Around 0715 spouse arrived and decided to withdraw cares. See code blue report for further details.

## 2022-09-08 NOTE — PROGRESS NOTES
ICU NOTE    Rapid response was called.     Abnormal rhythm on telemetry, torsades de Pointes.   Increase pressor requirements.    PLAN  Labs were ordered.   Infectious work up.   Broad Abx zosyn and vancomycin       Jared Steele MD  Pulmonary Critical Care Medicine  09/08/22  2:45 AM

## 2022-09-08 NOTE — SIGNIFICANT EVENT
Code was taking place on arrival to shift at 0700. Code continued until  showed up and decided to withdraw care. Time of death was 0855.

## 2022-09-08 NOTE — PROGRESS NOTES
RT PROGRESS NOTE    VENT DAY# 1    CURRENT SETTINGS:   Vent Mode: CMV/AC  (Continuous Mandatory Ventilation/ Assist Control)  FiO2 (%): 40 %  Resp Rate (Set): 14 breaths/min  Tidal Volume (Set, mL): 480 mL  PEEP (cm H2O): 5 cmH2O  Resp: 28      PATIENT PARAMETERS:  PIP 21  Pplat:  19  Pmean:  10  Secretions:  Scant pink/white thin  02 Sats:  97%  BS: Clear RUL, RML and KAREN. Diminished RLL and LLL.    ETT SIZE 7.5 Secured at 23 cm at teeth/gums      NOTE / SHIFT SUMMARY:   Pt intubated at approx 2240 for airway protection due to altered mental status. Sputum sample obtained and sent to lab.    Robby Robison, RRT

## 2022-09-08 NOTE — PROGRESS NOTES
ICU progress note    Assessment and plan: 72-year-old woman with prolonged cardiac arrest in the setting of complex picture of acute kidney injury, concern for myositis versus vasculitis or Guillain-Barré syndrome.  Unfortunately began to have persistent torsade which progressed to VF and then pulseless arrest.    At the time of my arrival she continue to receive ACLS protocol, which have been going on for 90 minutes.    Due to the concern for myositis there have been discussion with the [facility] regarding transfer for cannulation for ECMO.    I met with her  at the bedside.  I discussed the goals of ECMO and what her current clinical status was.  Although he appreciated the thought and consideration he felt that proceeding with these type of interventions would not be in line with his wife's goals of care.  Based on discussion with him we transitioned to comfort care.    Prior to and during this conversation I was titrating ventilator support, vasopressors, discussion with bedside RN and RT, charge nurse.    In total, 40 minutes of critical care time spent by me today.  This time is separate from goals of care discussions and any listed procedures.    Ajay Aguirre MD

## 2022-09-08 NOTE — SIGNIFICANT EVENT
2220- Writer in room with resource RN to reposition patient. Patient at the time following some commands and talking with RNs.     2225- Patient became unresponsive. Not able to follow commands. No eye blink reflex. No response to central or peripheral stimulation. Patient still with a pulse and spontaneous respirations. Maintained oxygen saturations. Dr. Fernandez called into room.     2230- Provider at bedside to assess patient. Patient still not following commands or responding to stimuli. MD decided to intubate as patient could no longer protect airway.     2250- Patient intubated. 2 versed, 100 fentanyl, and 20 etomidate given per MD orders. ETT #7.5 and 23 at the teeth.

## 2022-09-08 NOTE — PROGRESS NOTES
Per chart review this morning patient rapidly decompensated overnight with need for emergent intubation.  Patient also developed torsades as well as ventricular fibrillation as well as cardiac arrest unamenable to resuscitation despite almost 1 hours of CPR and multiple rounds of defibrillation.  Intensivist had plan to transfer patient to the [facility] ER for ECMO.  Patient's spouse arrived early in the morning of  and upon discussion with intensivist decision was made to withdraw cares and patient  at 0755.    Naomi Landry DO

## 2022-09-08 NOTE — PROGRESS NOTES
ICU NOTE    Code Blue was called due to torsades de pointes leading into ventricular fibrillation.     CPR was started.   Patient was in and out of ventricular fibrillation.  Patient was defibrillated in several opportunities.   Total duration of CPR was an hour approximate    Case was discussed with cardiology.  Loaded with amiodarone, lidocaine.   Bicarb and Mg were given.  Pacing patches were placed, paced at 100    Case was discussed with ECMO team at the [facility].     Plan to transfer to the [facility]     Jared Steele MD  Pulmonary Critical Care  09/08/22  6:59 AM

## 2022-09-08 NOTE — PROCEDURES
"[facility] Procedure Note          Intubation:      Date/Time: 09/07/22  11:00 PM     Performed by: Jared Steele MD    Consent: The procedure was performed in an emergent situation.  Patient identity confirmed: verbally with patient and arm band  Time out: Immediately prior to procedure a \"time out\" was called to verify the correct patient, procedure, equipment, support staff and site/side marked as required.    Indication:  Altered mental status, respiratory failure and airway protection.    Medications: etomidate 20 mg IV, versed 2 mg, fentanyl 50 mcg.     Glidescope blade 3    Visualization of vocal cords: yes, significant amount of dry oral secretions in oropharynx.   Cricoid pressure : no    ET tube 7.5 mm was used, secured at 23 cm at the teeth.   Number of attempts: 1  ET tube was confirmed by physical exam, CO2 detector and CXR  Complications:  None      Patient tolerance: Patient tolerated the procedure well with no immediate complications.    Jared Steele MD  Pulmonary Critical Care Medicine  09/07/22  11:00 PM            "

## 2022-09-08 NOTE — PROGRESS NOTES
Renal   Reviewed events.   Had pheresis x1, no apparent complications with pheresis.   Progressive resp decline, intubated around 11 pm.   Echo was unremarkable, filling pressure looked normal  Initial Mg 1.9, K4.1 after rhythm changes.   Then had torsades at 3am and inc pressor need, then code blue, 1 hour resuscitation.   Plans for TF to [facility] discussed but spouse here earlier and requested comfort care and pt passed.     Edith Triplett MD  [facility]  [phone]

## 2022-09-08 NOTE — PROCEDURES
"PICC Line Insertion Procedure Note    Pt. Name:   Sarina Alfaro  MRN:          [id number]    Procedure: Insertion of a  TRIPLE Lumen  5 fr  Bard SOLO (valved) Power PICC, Lot number [id number]    Indications: Medication Drips; Vasopressor    Contraindications : RIGHT MASTECTOMY    Procedure Details   Patient identified with 2 identifiers and \"Time Out\" conducted.    Central line insertion bundle followed: hand hygiene performed prior to procedure, site cleansed with cholraprep, hat, mask, sterile gloves, sterile gown worn, patient draped with maximum barrier head to toe drape, sterile field maintained.    The vein was assessed and found to be compressible and of adequate size. 2.5 ml 1% Lidocaine administered SQ to the insertion site. A 5 Fr PICC was inserted into the BASILIC vein of the left arm with ultrasound guidance. ONE attempt(s) required to access vein.   Catheter threaded without difficulty. Good blood return noted.    Modified Seldinger Technique used for insertion.    The 8 sharps that are included in the PICC insertion kit were accounted for and disposed of in the sharps container prior to breakdown of the sterile field.    Catheter secured with Statlock, biopatch and Tegaderm dressing applied.    Findings:  Total catheter length  43 cm, with 0 cm exposed. Mid upper arm circumference is 37 cm. Catheter was flushed with 30 cc NS. Patient  tolerated procedure well.    Tip placement verified in the distal SVC by [employer]:          CLABSI prevention brochure left at bedside.    Patient's primary RN notified PICC is ready for use.    Comments:          Luis Angel Smith, RN, MSN, VA-BC  Vascular Access - [facility]      "

## 2022-09-08 NOTE — DISCHARGE SUMMARY
[facility], Death Note          Provider / Nursing Notes:       Sarina Alfaro  MRN# [id number]   2022, 4:47 PM        Death occurred at: 0755 on 2022   Events preceding:  Patient initially presented on 2022 with complaints of muscle weakness and diffuse muscle pain.  Initially she had elevated inflammatory markers and CORNELIUS concerning for possible rheumatological disorder such as myositis, vasculitis and work-up for such was initiated after discussion with [facility] rheumatology.  Extensive infectious disease work-up unrevealing.  Within a 36-hour period of time patient quickly decompensated and was unable to move, barely speak or maintain her own airway.  Guillain-Barré was suspected and  Plasmapheresis initiation 2022.  The evening of 2022 patient lost ability to safely maintain her own airway and was emergently intubated.  Shortly after intubation patient developed torsades and ventricular fibrillation.  Despite attempts for resuscitation POLST was not achieved and patient's spouse endorsed he did not believe that his wife would want to continue any intensive interventions and ventilatory support was withdrawn.  Patient  a short time later.   Family / Significant Other: Family was present   Tissue / Organ Donation: Donation was discussed, eye donation eliible   Autopsy: Autopsy was discussed and patient's  elected for autopsy to be performed   Belongings: Sent home     Cause of death: ventricular fibrillation and cardiac arrest  Contributing factors: Guillain-Barré syndrome, CORNELIUS from acute tubular necrosis    Recorded by Naomi Landry DO

## 2022-09-08 NOTE — SIGNIFICANT EVENT
0225- Code blue called. Patient lost a pulse and went into Torsades gilbert Pointes. About 30 seconds of CPR was performed. Patient had a pulse and was in SR. Dr. Fernandez at bedside. Labs ordered. Art line placed.

## 2022-09-08 NOTE — PROGRESS NOTES
ICU NOTE    RN informed me that patient is unresponsive.  Patient was evaluated.   Patient did not respond to verbal or painful stimulus.   Eyes are open, pupils reactive.   Patient is breathing of her own.    Patient is tachycardic and normotensive.     Chart was reviewed.     PLAN    Intubation for airway protection .  Start sedation with propofol  Patient is already scheduled for brain MRI    Case was discussed with on call neurology  EEG was not recommended, follow up basic labs and brain MRI    Additional ICU time 30 minutes     Jared Steele MD  Pulmonary Critical Care  09/07/22 11:13 PM

## 2022-09-08 NOTE — PROCEDURES
ARTERIAL LINE INSERTION PROCEDURE NOTE  (NON-OR)    Procedure Date:  9/8/2022   Performing Physician:  Jared Steele MD    Pre-Procedure Diagnosis:     HYPOTENSION and RESPIRATORY FAILURE  Post-Procedure Diagnosis:  Same as Pre-Procedure Diagnosis    Procedure:  Arterial line insertion  Right Radial  Indications:  HYPOTENSION and RESPIRATORY FAILURE      Estimated Blood Loss: Minimal   Complications: none      Procedure Details:   Procedure was done as an emergency.  The patient was identified as Sarina Alfaro with Date of Birth 1950 and the procedure verified as Arterial Line Insertion.  A Time Out was held and the above information confirmed.    An Mario test was  done before the procedure.  The Mario test results were  positive .  In sterile fashion, the line site was prepped with Chlorhexidine.  Strict sterile conditions were maintained,  Cap, mask, and sterile gloves were worn by all participants.  1 ml of   Lidocaine 1% without epinephrine anesthetic were infiltrated into the skin.  The arterial line was placed in the Right Radial artery percutaneously,  without  difficulty.  The linewas  sutured in place  and an occlusive sterile dressing was applied.  The total number of needle stick attempts was 1.      Condition: unstable    Jared Steele MD, 9/8/2022, 3:11 AM

## 2022-09-08 NOTE — PLAN OF CARE
[facility] - ICU    RN Progress Note:            Pertinent Assessments:      Please refer to flowsheet rows for full assessment     Unresponsive to painful stimuli. No blink reflex. Cough and gag reflex present with suctioning. Frequent PVCs/ bigeminy.          Mobility Level:     2- Q2 reposition changes.          Key Events - This Shift:     Intubated overnight for airway protection. See significant event note. Required escalating doses of pressors. MD notified. Code blue called. See note.               Plan:              Point of Contact Update YES-OR-NO: Yes  Name:Felix Alfaro   Phone Number:[phone]  Summary of Conversation: Updated spouse, Felix, that patient required intubation. Patient became unresponsive and unable to protect her airway. Spouse verbalized understanding. Notified spouse that patient was going to go for an MRI and that hopefully we would have more answers for him in the morning. Felix verbalized that he has appointments today for a possible procedure today or tomorrow so he will not be available until later in the afternoon.

## 2022-09-09 ENCOUNTER — LAB REQUISITION (OUTPATIENT)
Dept: LAB | Facility: CLINIC | Age: 72
End: 2022-09-09

## 2022-09-09 LAB
A PHAGOCYTOPH IGG TITR SER IF: NORMAL {TITER}
A PHAGOCYTOPH IGM TITR SER IF: NORMAL {TITER}
B BURGDOR IGG CSF QL IB: NEGATIVE
B BURGDOR IGG+IGM SER QL: 0.01
B BURGDOR IGM CSF QL IB: NEGATIVE
HOLD SPECIMEN: NORMAL
MITOCHONDRIA M2 IGG SER-ACNC: <1 U/ML

## 2022-09-10 LAB
A PHAGOCYTOPH DNA BLD QL NAA+PROBE: NOT DETECTED
BACTERIA BLD CULT: ABNORMAL
BACTERIA BLD CULT: ABNORMAL
BACTERIA SPT CULT: ABNORMAL
BACTERIA SPT CULT: ABNORMAL
E CHAFFEENSIS DNA BLD QL NAA+PROBE: NOT DETECTED
E EWINGII DNA SPEC QL NAA+PROBE: NOT DETECTED
EHRLICHIA DNA SPEC QL NAA+PROBE: NOT DETECTED

## 2022-09-11 LAB
BACTERIA BLD CULT: ABNORMAL
BACTERIA BLD CULT: ABNORMAL
MAYO MISC RESULT: NORMAL

## 2022-09-12 LAB
BACTERIA CSF CULT: NO GROWTH
GRAM STAIN RESULT: NORMAL
GRAM STAIN RESULT: NORMAL

## 2022-09-13 LAB
ACHR BIND IGG+IGM SER IA-SCNC: 0 NMOL/L
IMMUNOLOGIST REVIEW: NORMAL

## 2022-09-15 LAB
ANA SER QL: NEGATIVE
ANNOTATION COMMENT IMP: NORMAL
EJ AB SER QL: NEGATIVE
ENA JO1 IGG SER-ACNC: 0 AU/ML
MDA5 AB SER QL LINE BLOT: NEGATIVE
MI2 AB SER QL: NEGATIVE
MJ AB SER QL LINE BLOT: NEGATIVE
OJ AB SER QL: NEGATIVE
PL12 AB SER QL: NEGATIVE
PL7 AB SER QL: NEGATIVE
SAE1 AB SER QL LINE BLOT: NEGATIVE
SRP AB SERPL QL: NEGATIVE
TIF1-GAMMA AB SER QL LINE BLOT: NEGATIVE

## 2022-10-26 LAB
PATH FINDINGS: NORMAL
PATH REPORT.COMMENTS IMP SPEC: NORMAL
PATH REPORT.FINAL DX SPEC: NORMAL
PATH REPORT.FINAL DX SPEC: NORMAL
PATH REPORT.GROSS SPEC: NORMAL
PATH REPORT.MICROSCOPIC SPEC OTHER STN: NORMAL
PATH REPORT.RELEVANT HX SPEC: NORMAL
PATH REPORT.SITE OF ORIGIN SPEC: NORMAL
PATH REPORT.SITE OF ORIGIN SPEC: NORMAL

## (undated) RX ORDER — HEPARIN SODIUM 1000 [USP'U]/ML
INJECTION, SOLUTION INTRAVENOUS; SUBCUTANEOUS
Status: DISPENSED
Start: 2022-01-01

## (undated) RX ORDER — LIDOCAINE HYDROCHLORIDE 10 MG/ML
INJECTION, SOLUTION EPIDURAL; INFILTRATION; INTRACAUDAL; PERINEURAL
Status: DISPENSED
Start: 2022-01-01